# Patient Record
Sex: MALE | HISPANIC OR LATINO | Employment: FULL TIME | URBAN - METROPOLITAN AREA
[De-identification: names, ages, dates, MRNs, and addresses within clinical notes are randomized per-mention and may not be internally consistent; named-entity substitution may affect disease eponyms.]

---

## 2017-08-06 ENCOUNTER — OFFICE VISIT (OUTPATIENT)
Dept: URGENT CARE | Facility: PHYSICIAN GROUP | Age: 42
End: 2017-08-06
Payer: COMMERCIAL

## 2017-08-06 VITALS
BODY MASS INDEX: 33.74 KG/M2 | WEIGHT: 215 LBS | SYSTOLIC BLOOD PRESSURE: 140 MMHG | HEIGHT: 67 IN | DIASTOLIC BLOOD PRESSURE: 90 MMHG | OXYGEN SATURATION: 95 % | HEART RATE: 67 BPM | TEMPERATURE: 98.8 F

## 2017-08-06 DIAGNOSIS — S86.111A GASTROCNEMIUS MUSCLE TEAR, RIGHT, INITIAL ENCOUNTER: ICD-10-CM

## 2017-08-06 PROCEDURE — 99213 OFFICE O/P EST LOW 20 MIN: CPT | Performed by: PHYSICIAN ASSISTANT

## 2017-08-06 ASSESSMENT — ENCOUNTER SYMPTOMS
FALLS: 0
JOINT SWELLING: 0
TREMORS: 0
MYALGIAS: 1
WEIGHT LOSS: 0
TINGLING: 0
ARTHRALGIAS: 0
SENSORY CHANGE: 0
WEAKNESS: 0
CHILLS: 0
BACK PAIN: 0
LEG PAIN: 1
FEVER: 0
NUMBNESS: 0

## 2017-08-06 ASSESSMENT — PATIENT HEALTH QUESTIONNAIRE - PHQ9: CLINICAL INTERPRETATION OF PHQ2 SCORE: 0

## 2017-08-06 NOTE — PROGRESS NOTES
Subjective:      Jose Juan Hatch is a 41 y.o. male who presents with Leg Pain            HPI Comments: Patient was playing soccer when he made a sudden change in motion and felt a pop in the back of the calf and it felt as though he had been kicked in the back of the leg.  He has swelling and decreased movement of the right lower leg.  He denies pain when at rest but pain with walking, weight bearing and walking.  No previous h/o injury to the leg.  Denies numbness/tingling.  He has been icing, using Ibuprofen and wrapping the leg for discomfort with relief.      Leg Pain  This is a new problem. The current episode started yesterday. The problem occurs constantly. The problem has been unchanged. Associated symptoms include myalgias. Pertinent negatives include no arthralgias, chills, fever, joint swelling, numbness or weakness. The symptoms are aggravated by walking. He has tried NSAIDs, ice and immobilization for the symptoms. The treatment provided moderate relief.   PMH:  has a past medical history of Acid reflux and Kidney stone.  MEDS:   Current outpatient prescriptions:   •  OMEPRAZOLE PO, Take  by mouth., Disp: , Rfl:   ALLERGIES: No Known Allergies  SURGHX: History reviewed. No pertinent past surgical history.  SOCHX:  reports that he has never smoked. He does not have any smokeless tobacco history on file. He reports that he drinks about 4.2 oz of alcohol per week. He reports that he does not use illicit drugs.  FH: Family history was reviewed, no pertinent findings to report     Review of Systems   Constitutional: Negative for fever, chills, weight loss and malaise/fatigue.   Musculoskeletal: Positive for myalgias. Negative for back pain, joint pain, falls, joint swelling and arthralgias.   Neurological: Negative for tingling, tremors, sensory change, weakness and numbness.   All other systems reviewed and are negative.         Objective:     /90 mmHg  Pulse 67  Temp(Src) 37.1 °C (98.8 °F)  Ht 1.702  "m (5' 7.01\")  Wt 97.523 kg (215 lb)  BMI 33.67 kg/m2  SpO2 95%     Physical Exam   Constitutional: He is oriented to person, place, and time. He appears well-developed and well-nourished.   Cardiovascular: Intact distal pulses.    Musculoskeletal:   Right LE decreased extension of the right foot, full flexion;  TTP over the right calf with mild swelling;  Ankle and knee joints are without tenderness or swelling;  Gallegos test is negative, limited evaluation due to discomfort but patient does have plantarflexion   Neurological: He is alert and oriented to person, place, and time.   Skin: Skin is warm and dry. No rash noted. No erythema. No pallor.   Mild swelling of the right posterior lower leg   Psychiatric: He has a normal mood and affect. His behavior is normal. Judgment and thought content normal.   Nursing note and vitals reviewed.              Assessment/Plan:     1. Gastrocnemius muscle tear, right, initial encounter  Patient referred to Sports Medicine for ongoing management of suspected tear.  Will treat with RICE and non-weight bearing.  Patient declined need for pain medication.  Follow-up sooner if symptoms change, get worse or new symptoms develop.    - CRUTCHES  - BANDAGE ACE 4\"  - REFERRAL TO SPORTS MEDICINE        "

## 2017-08-06 NOTE — MR AVS SNAPSHOT
"        Jose Juan Steeleala   2017 1:45 PM   Office Visit   MRN: [id number]    Department:  [facility]   Dept Phone:  [phone]    Description:  Male : 1975   Provider:  Meche Myrick PA-C           Reason for Visit     Leg Pain Right calf pain pt states he was playing soccer and he was trying to kick the ball and felt a pop Sx pain when walking x yesterday      Allergies as of 2017     No Known Allergies      You were diagnosed with     Gastrocnemius muscle tear, right, initial encounter   [[id number]]         Vital Signs     Blood Pressure Pulse Temperature Height Weight Body Mass Index    140/90 mmHg 67 37.1 °C (98.8 °F) 1.702 m (5' 7.01\") 97.523 kg (215 lb) 33.67 kg/m2    Oxygen Saturation Smoking Status                95% Never Smoker           Basic Information     Date Of Birth Sex Race Ethnicity Preferred Language    1975 Male  or   Origin (Syriac,Tuvaluan,St Lucian,Guyanese, etc) English      Health Maintenance        Date Due Completion Dates    IMM DTaP/Tdap/Td Vaccine (1 - Tdap) 1993 ---    IMM INFLUENZA (1) 2017 ---            Current Immunizations     No immunizations on file.      Below and/or attached are the medications your provider expects you to take. Review all of your home medications and newly ordered medications with your provider and/or pharmacist. Follow medication instructions as directed by your provider and/or pharmacist. Please keep your medication list with you and share with your provider. Update the information when medications are discontinued, doses are changed, or new medications (including over-the-counter products) are added; and carry medication information at all times in the event of emergency situations     Allergies:  No Known Allergies          Medications  Valid as of: 2017 -  3:07 PM    Generic Name Brand Name Tablet Size Instructions for use    Omeprazole   Take  by mouth.        .                 "   Medicines prescribed today were sent to:     [id number] - [place], [place] - [address] [place] [address]    [address] [place] [address]    Phone: [phone] Fax: [phone]    Open 24 Hours?: No      Medication refill instructions:       If your prescription bottle indicates you have medication refills left, it is not necessary to call your provider’s office. Please contact your pharmacy and they will refill your medication.    If your prescription bottle indicates you do not have any refills left, you may request refills at any time through one of the following ways: The online HengZhi system (except Urgent Care), by calling your provider’s office, or by asking your pharmacy to contact your provider’s office with a refill request. Medication refills are processed only during regular business hours and may not be available until the next business day. Your provider may request additional information or to have a follow-up visit with you prior to refilling your medication.   *Please Note: Medication refills are assigned a new Rx number when refilled electronically. Your pharmacy may indicate that no refills were authorized even though a new prescription for the same medication is available at the pharmacy. Please request the medicine by name with the pharmacy before contacting your provider for a refill.        Referral     A referral request has been sent to our patient care coordination department. Please allow 3-5 business days for us to process this request and contact you either by phone or mail. If you do not hear from us by the 5th business day, please call us at [phone].           HengZhi Access Code: [email or web address]  Expires: 9/5/2017  1:33 PM    HengZhi  A secure, online tool to manage your health information     [employer]’s HengZhi® is a secure, online tool that connects you to your personalized health information from the privacy of your home -- day or night - making it very easy for you  to manage your healthcare. Once the activation process is completed, you can even access your medical information using the M360LOHAS outdoors marcelo, which is available for free in the Apple Marcelo store or Google Play store.     M360LOHAS outdoors provides the following levels of access (as shown below):   My Chart Features   [facility] Doctor [facility]  Specialists [facility]  Urgent  Care Non-[facility]  Primary Care  Doctor   Email your healthcare team securely and privately 24/7 X X X    Manage appointments: schedule your next appointment; view details of past/upcoming appointments X      Request prescription refills. X      View recent personal medical records, including lab and immunizations X X X X   View health record, including health history, allergies, medications X X X X   Read reports about your outpatient visits, procedures, consult and ER notes X X X X   See your discharge summary, which is a recap of your hospital and/or ER visit that includes your diagnosis, lab results, and care plan. X X       How to register for M360LOHAS outdoors:  1. Go to  [email or web address].  2. Click on the Sign Up Now box, which takes you to the New Member Sign Up page. You will need to provide the following information:  a. Enter your M360LOHAS outdoors Access Code exactly as it appears at the top of this page. (You will not need to use this code after you’ve completed the sign-up process. If you do not sign up before the expiration date, you must request a new code.)   b. Enter your date of birth.   c. Enter your home email address.   d. Click Submit, and follow the next screen’s instructions.  3. Create a M360LOHAS outdoors ID. This will be your M360LOHAS outdoors login ID and cannot be changed, so think of one that is secure and easy to remember.  4. Create a M360LOHAS outdoors password. You can change your password at any time.  5. Enter your Password Reset Question and Answer. This can be used at a later time if you forget your password.   6. Enter your e-mail address. This allows you to  receive e-mail notifications when new information is available in [employer].  7. Click Sign Up. You can now view your health information.    For assistance activating your [employer] account, call [phone]

## 2017-08-07 ENCOUNTER — TELEPHONE (OUTPATIENT)
Dept: URGENT CARE | Facility: PHYSICIAN GROUP | Age: 42
End: 2017-08-07

## 2017-08-07 NOTE — TELEPHONE ENCOUNTER
PtJose Juan, phoned stating he was returning our call.  He was transferred to the [facility] site to set up an appointment for Sports Medicine.

## 2017-08-10 ENCOUNTER — OFFICE VISIT (OUTPATIENT)
Dept: MEDICAL GROUP | Facility: CLINIC | Age: 42
End: 2017-08-10
Payer: COMMERCIAL

## 2017-08-10 VITALS
RESPIRATION RATE: 16 BRPM | DIASTOLIC BLOOD PRESSURE: 86 MMHG | SYSTOLIC BLOOD PRESSURE: 126 MMHG | OXYGEN SATURATION: 96 % | WEIGHT: 215 LBS | BODY MASS INDEX: 33.74 KG/M2 | TEMPERATURE: 98 F | HEART RATE: 79 BPM | HEIGHT: 67 IN

## 2017-08-10 DIAGNOSIS — S86.111A: ICD-10-CM

## 2017-08-10 PROCEDURE — 99203 OFFICE O/P NEW LOW 30 MIN: CPT | Performed by: FAMILY MEDICINE

## 2017-08-10 ASSESSMENT — ENCOUNTER SYMPTOMS
NAUSEA: 0
CHILLS: 0
SHORTNESS OF BREATH: 0
DIZZINESS: 0
VOMITING: 0
FEVER: 0

## 2017-08-10 NOTE — MR AVS SNAPSHOT
"        Jose Juan Steeleala   8/10/2017 4:30 PM   Office Visit   MRN: [id number]    Department:  [facility]   Dept Phone:  [phone]    Description:  Male : 1975   Provider:  Wang Guerrier M.D.           Reason for Visit     Leg Injury Referral from UC/ R leg muscle tear       Allergies as of 8/10/2017     No Known Allergies      You were diagnosed with     Strain of gastrocnemius tendon of right lower extremity, initial encounter   [[id number]]         Vital Signs     Blood Pressure Pulse Temperature Respirations Height Weight    126/86 mmHg 79 36.7 °C (98 °F) 16 1.702 m (5' 7.01\") 97.523 kg (215 lb)    Body Mass Index Oxygen Saturation Smoking Status             33.67 kg/m2 96% Never Smoker          Basic Information     Date Of Birth Sex Race Ethnicity Preferred Language    1975 Male  or   Origin (Japanese,Cape Verdean,Macedonian,New Zealander, etc) English      Your appointments     Aug 31, 2017  4:00 PM   Established Patient with Wang Guerrier M.D.   [facility]    [address] [address]   [phone]           You will be receiving a confirmation call a few days before your appointment from our automated call confirmation system.              Health Maintenance        Date Due Completion Dates    IMM DTaP/Tdap/Td Vaccine (1 - Tdap) 1993 ---    IMM INFLUENZA (1) 2017 ---            Current Immunizations     No immunizations on file.      Below and/or attached are the medications your provider expects you to take. Review all of your home medications and newly ordered medications with your provider and/or pharmacist. Follow medication instructions as directed by your provider and/or pharmacist. Please keep your medication list with you and share with your provider. Update the information when medications are discontinued, doses are changed, or new medications (including over-the-counter products) are added; and carry " medication information at all times in the event of emergency situations     Allergies:  No Known Allergies          Medications  Valid as of: August 11, 2017 -  8:24 AM    Generic Name Brand Name Tablet Size Instructions for use    Omeprazole   Take  by mouth.        .                 Medicines prescribed today were sent to:     [id number] - [place], [place] - [address]    [address] [place] [address]    Phone: [phone] Fax: [phone]    Open 24 Hours?: No      Medication refill instructions:       If your prescription bottle indicates you have medication refills left, it is not necessary to call your provider’s office. Please contact your pharmacy and they will refill your medication.    If your prescription bottle indicates you do not have any refills left, you may request refills at any time through one of the following ways: The online Eltechs system (except Urgent Care), by calling your provider’s office, or by asking your pharmacy to contact your provider’s office with a refill request. Medication refills are processed only during regular business hours and may not be available until the next business day. Your provider may request additional information or to have a follow-up visit with you prior to refilling your medication.   *Please Note: Medication refills are assigned a new Rx number when refilled electronically. Your pharmacy may indicate that no refills were authorized even though a new prescription for the same medication is available at the pharmacy. Please request the medicine by name with the pharmacy before contacting your provider for a refill.           Eltechs Access Code: [email or web address]  Expires: 9/5/2017  1:33 PM    Eltechs  A secure, online tool to manage your health information     [employer]’s Eltechs® is a secure, online tool that connects you to your personalized health information from the privacy of your home -- day or night - making it very easy for you to  manage your healthcare. Once the activation process is completed, you can even access your medical information using the Heroku marcelo, which is available for free in the Apple Marcelo store or Google Play store.     Heroku provides the following levels of access (as shown below):   My Chart Features   [facility] Doctor [facility]  Specialists [facility]  Urgent  Care Non-[facility]  Primary Care  Doctor   Email your healthcare team securely and privately 24/7 X X X    Manage appointments: schedule your next appointment; view details of past/upcoming appointments X      Request prescription refills. X      View recent personal medical records, including lab and immunizations X X X X   View health record, including health history, allergies, medications X X X X   Read reports about your outpatient visits, procedures, consult and ER notes X X X X   See your discharge summary, which is a recap of your hospital and/or ER visit that includes your diagnosis, lab results, and care plan. X X       How to register for Heroku:  1. Go to  [email or web address].  2. Click on the Sign Up Now box, which takes you to the New Member Sign Up page. You will need to provide the following information:  a. Enter your Heroku Access Code exactly as it appears at the top of this page. (You will not need to use this code after you’ve completed the sign-up process. If you do not sign up before the expiration date, you must request a new code.)   b. Enter your date of birth.   c. Enter your home email address.   d. Click Submit, and follow the next screen’s instructions.  3. Create a Heroku ID. This will be your Heroku login ID and cannot be changed, so think of one that is secure and easy to remember.  4. Create a Heroku password. You can change your password at any time.  5. Enter your Password Reset Question and Answer. This can be used at a later time if you forget your password.   6. Enter your e-mail address. This allows you to  receive e-mail notifications when new information is available in [employer].  7. Click Sign Up. You can now view your health information.    For assistance activating your [employer] account, call [phone]

## 2017-08-10 NOTE — PROGRESS NOTES
"Subjective:      Jose Juan Hatch is a 41 y.o. male who presents with Leg Injury      Referred by Meche Myrick PA-C for evaluation of RIGHT leg pain    Leg Injury     DOI: Saturday, 8/5/17    Playing soccer he was cutting and felt a pop in the back of the RIGHT medial calf and it felt as though he had been kicked in the back of the leg.  Ache  No radiation    He has POSITIVE swelling and decreased movement of the right lower leg.    No pain at rest but pain with walking, weight bearing and walking.    No previous h/o injury to the leg.  Denies numbness/tingling.    He has been icing, using Ibuprofen and wrapping the leg for discomfort with relief.    Review of Systems   Constitutional: Negative for fever and chills.   Respiratory: Negative for shortness of breath.    Cardiovascular: Negative for chest pain.   Gastrointestinal: Negative for nausea and vomiting.   Neurological: Negative for dizziness.     PMH:  has a past medical history of Acid reflux and Kidney stone.  MEDS:   Current outpatient prescriptions:   •  OMEPRAZOLE PO, Take  by mouth., Disp: , Rfl:   ALLERGIES: No Known Allergies  SURGHX: History reviewed. No pertinent past surgical history.  SOCHX:  reports that he has never smoked. He has never used smokeless tobacco. He reports that he drinks about 4.2 oz of alcohol per week. He reports that he does not use illicit drugs.  FH: Family history was reviewed, no pertinent findings to report       Objective:     /86 mmHg  Pulse 79  Temp(Src) 36.7 °C (98 °F)  Resp 16  Ht 1.702 m (5' 7.01\")  Wt 97.523 kg (215 lb)  BMI 33.67 kg/m2  SpO2 96%     Physical Exam   Constitutional: He appears well-developed.   Pulmonary/Chest: Effort normal.   Neurological: He is alert.   Skin: Skin is warm and dry.   Psychiatric: He has a normal mood and affect.         ANTALGIC gait  15 cm distal to inf patella  RIGHT calf measures 43 cm  LEFT measures 42 cm  POSITIVE tenderness at the distal aspect of the medial " gastrocnemius on the RIGHT just above the musculotendinous junction compared to the left  PAIN is worse with passive dorsiflexion and active plantar flexion on the RIGHT compared to left with the knee in extension  There is NO pain with passive dorsiflexion or active plantar flexion with the knee flexed     Assessment/Plan:     1. Strain of gastrocnemius tendon of right lower extremity, initial encounter         Acute medial gastrocnemius injury while playing soccer/cutting   Patient was placed in a cam walker boot  Advised to wear a boot for at least 3-4 weeks (removal on or after August 31, 2017)    Return in about 3 weeks (around 8/31/2017).    Thank you Meche Myrick PA-C for allowing me to participate in caring for your patient.

## 2018-12-10 ENCOUNTER — OFFICE VISIT (OUTPATIENT)
Dept: URGENT CARE | Facility: PHYSICIAN GROUP | Age: 43
End: 2018-12-10
Payer: COMMERCIAL

## 2018-12-10 VITALS
SYSTOLIC BLOOD PRESSURE: 160 MMHG | HEART RATE: 95 BPM | TEMPERATURE: 97.3 F | BODY MASS INDEX: 34.84 KG/M2 | WEIGHT: 222 LBS | RESPIRATION RATE: 18 BRPM | OXYGEN SATURATION: 98 % | DIASTOLIC BLOOD PRESSURE: 104 MMHG | HEIGHT: 67 IN

## 2018-12-10 DIAGNOSIS — I10 ESSENTIAL HYPERTENSION: ICD-10-CM

## 2018-12-10 DIAGNOSIS — K21.9 GASTROESOPHAGEAL REFLUX DISEASE, ESOPHAGITIS PRESENCE NOT SPECIFIED: ICD-10-CM

## 2018-12-10 PROCEDURE — 99214 OFFICE O/P EST MOD 30 MIN: CPT | Performed by: FAMILY MEDICINE

## 2018-12-10 RX ORDER — LISINOPRIL 10 MG/1
10 TABLET ORAL DAILY
Qty: 30 TAB | Refills: 0 | Status: SHIPPED | OUTPATIENT
Start: 2018-12-10 | End: 2018-12-11

## 2018-12-10 RX ORDER — SUCRALFATE 1 G/1
1 TABLET ORAL
Qty: 120 TAB | Refills: 0 | Status: SHIPPED | OUTPATIENT
Start: 2018-12-10 | End: 2018-12-11

## 2018-12-10 RX ORDER — IBUPROFEN 200 MG
400 TABLET ORAL EVERY 6 HOURS PRN
COMMUNITY
End: 2019-02-01

## 2018-12-10 ASSESSMENT — ENCOUNTER SYMPTOMS
LOWER EXTREMITY EDEMA: 0
PALPITATIONS: 0
COUGH: 0
IRREGULAR HEARTBEAT: 0
FEVER: 0
ABDOMINAL PAIN: 0
PND: 0
LEG PAIN: 0
NAUSEA: 0

## 2018-12-10 NOTE — PROGRESS NOTES
"Subjective:   Jose Juan Hatch is a 43 y.o. male who presents for Chest Pain (discomfort onset last night)        Chest Pain    This is a new problem. The current episode started yesterday. The onset quality is sudden. The problem occurs constantly. The problem has been waxing and waning. The pain is present in the substernal region. The pain is moderate. The quality of the pain is described as sharp. The pain does not radiate. Pertinent negatives include no abdominal pain, cough, fever, irregular heartbeat, leg pain, lower extremity edema, nausea, palpitations or PND.     Review of Systems   Constitutional: Negative for fever.   Respiratory: Negative for cough.    Cardiovascular: Positive for chest pain. Negative for palpitations and PND.   Gastrointestinal: Negative for abdominal pain and nausea.     No Known Allergies   Objective:   /104   Pulse 95   Temp 36.3 °C (97.3 °F) (Temporal)   Resp 18   Ht 1.702 m (5' 7\")   Wt 100.7 kg (222 lb)   SpO2 98%   BMI 34.77 kg/m²   Physical Exam   Constitutional: He is oriented to person, place, and time. He appears well-developed and well-nourished. No distress.   HENT:   Head: Normocephalic and atraumatic.   Eyes: Pupils are equal, round, and reactive to light. Conjunctivae and EOM are normal.   Cardiovascular: Normal rate and regular rhythm.    No murmur heard.  Pulmonary/Chest: Effort normal and breath sounds normal. No respiratory distress.   Abdominal: Soft. He exhibits no distension. There is no tenderness.   Neurological: He is alert and oriented to person, place, and time. He has normal reflexes. No sensory deficit.   Skin: Skin is warm and dry.   Psychiatric: He has a normal mood and affect.         Assessment/Plan:   1. Gastroesophageal reflux disease, esophagitis presence not specified  - EKG - Clinic Performed  - hyoscyamine-maalox plus-lidocaine viscous (GI COCKTAIL); Take 30 mL by mouth Once for 1 dose.  Dispense: 30 mL; Refill: 0  - sucralfate " (CARAFATE) 1 GM Tab; Take 1 Tab by mouth 4 Times a Day,Before Meals and at Bedtime.  Dispense: 120 Tab; Refill: 0    2. Essential hypertension  - lisinopril (PRINIVIL) 10 MG Tab; Take 1 Tab by mouth every day.  Dispense: 30 Tab; Refill: 0    Other orders  - ibuprofen (MOTRIN) 200 MG Tab; Take 200 mg by mouth every 6 hours as needed.  Significant improvement with in office GI cocktail.  Differential diagnosis, natural history, supportive care, and indications for immediate follow-up discussed.

## 2018-12-11 ENCOUNTER — APPOINTMENT (OUTPATIENT)
Dept: RADIOLOGY | Facility: MEDICAL CENTER | Age: 43
End: 2018-12-11
Attending: EMERGENCY MEDICINE
Payer: COMMERCIAL

## 2018-12-11 ENCOUNTER — HOSPITAL ENCOUNTER (EMERGENCY)
Facility: MEDICAL CENTER | Age: 43
End: 2018-12-11
Attending: EMERGENCY MEDICINE
Payer: COMMERCIAL

## 2018-12-11 ENCOUNTER — HOSPITAL ENCOUNTER (INPATIENT)
Facility: MEDICAL CENTER | Age: 43
LOS: 2 days | DRG: 247 | End: 2018-12-13
Attending: EMERGENCY MEDICINE | Admitting: INTERNAL MEDICINE
Payer: COMMERCIAL

## 2018-12-11 ENCOUNTER — APPOINTMENT (OUTPATIENT)
Dept: URGENT CARE | Facility: PHYSICIAN GROUP | Age: 43
End: 2018-12-11
Payer: COMMERCIAL

## 2018-12-11 VITALS
OXYGEN SATURATION: 98 % | SYSTOLIC BLOOD PRESSURE: 169 MMHG | WEIGHT: 224.65 LBS | TEMPERATURE: 98.1 F | BODY MASS INDEX: 35.26 KG/M2 | HEIGHT: 67 IN | DIASTOLIC BLOOD PRESSURE: 103 MMHG | RESPIRATION RATE: 11 BRPM | HEART RATE: 77 BPM

## 2018-12-11 DIAGNOSIS — I24.9 ACUTE CORONARY SYNDROME (HCC): ICD-10-CM

## 2018-12-11 DIAGNOSIS — I21.4 NSTEMI (NON-ST ELEVATED MYOCARDIAL INFARCTION) (HCC): ICD-10-CM

## 2018-12-11 PROBLEM — E66.9 OBESITY: Status: ACTIVE | Noted: 2018-12-11

## 2018-12-11 PROBLEM — I10 HYPERTENSION: Status: ACTIVE | Noted: 2018-12-11

## 2018-12-11 PROBLEM — E78.5 HLD (HYPERLIPIDEMIA): Status: ACTIVE | Noted: 2018-12-11

## 2018-12-11 LAB
ALBUMIN SERPL BCP-MCNC: 4.5 G/DL (ref 3.2–4.9)
ALBUMIN SERPL BCP-MCNC: 4.9 G/DL (ref 3.2–4.9)
ALBUMIN/GLOB SERPL: 1.2 G/DL
ALBUMIN/GLOB SERPL: 1.2 G/DL
ALP SERPL-CCNC: 87 U/L (ref 30–99)
ALP SERPL-CCNC: 93 U/L (ref 30–99)
ALT SERPL-CCNC: 44 U/L (ref 2–50)
ALT SERPL-CCNC: 53 U/L (ref 2–50)
ANION GAP SERPL CALC-SCNC: 8 MMOL/L (ref 0–11.9)
ANION GAP SERPL CALC-SCNC: 9 MMOL/L (ref 0–11.9)
APTT PPP: 30.7 SEC (ref 24.7–36)
AST SERPL-CCNC: 41 U/L (ref 12–45)
AST SERPL-CCNC: 50 U/L (ref 12–45)
BASOPHILS # BLD AUTO: 0.3 % (ref 0–1.8)
BASOPHILS # BLD AUTO: 0.4 % (ref 0–1.8)
BASOPHILS # BLD: 0.03 K/UL (ref 0–0.12)
BASOPHILS # BLD: 0.03 K/UL (ref 0–0.12)
BILIRUB SERPL-MCNC: 0.8 MG/DL (ref 0.1–1.5)
BILIRUB SERPL-MCNC: 0.8 MG/DL (ref 0.1–1.5)
BNP SERPL-MCNC: 48 PG/ML (ref 0–100)
BNP SERPL-MCNC: 54 PG/ML (ref 0–100)
BUN SERPL-MCNC: 15 MG/DL (ref 8–22)
BUN SERPL-MCNC: 16 MG/DL (ref 8–22)
CALCIUM SERPL-MCNC: 9.2 MG/DL (ref 8.4–10.2)
CALCIUM SERPL-MCNC: 9.7 MG/DL (ref 8.5–10.5)
CHLORIDE SERPL-SCNC: 101 MMOL/L (ref 96–112)
CHLORIDE SERPL-SCNC: 104 MMOL/L (ref 96–112)
CO2 SERPL-SCNC: 24 MMOL/L (ref 20–33)
CO2 SERPL-SCNC: 25 MMOL/L (ref 20–33)
CREAT SERPL-MCNC: 1.08 MG/DL (ref 0.5–1.4)
CREAT SERPL-MCNC: 1.14 MG/DL (ref 0.5–1.4)
EKG IMPRESSION: NORMAL
EKG IMPRESSION: NORMAL
EOSINOPHIL # BLD AUTO: 0.02 K/UL (ref 0–0.51)
EOSINOPHIL # BLD AUTO: 0.02 K/UL (ref 0–0.51)
EOSINOPHIL NFR BLD: 0.2 % (ref 0–6.9)
EOSINOPHIL NFR BLD: 0.2 % (ref 0–6.9)
ERYTHROCYTE [DISTWIDTH] IN BLOOD BY AUTOMATED COUNT: 39.9 FL (ref 35.9–50)
ERYTHROCYTE [DISTWIDTH] IN BLOOD BY AUTOMATED COUNT: 41.5 FL (ref 35.9–50)
GLOBULIN SER CALC-MCNC: 3.7 G/DL (ref 1.9–3.5)
GLOBULIN SER CALC-MCNC: 4.2 G/DL (ref 1.9–3.5)
GLUCOSE SERPL-MCNC: 109 MG/DL (ref 65–99)
GLUCOSE SERPL-MCNC: 116 MG/DL (ref 65–99)
HCT VFR BLD AUTO: 46.1 % (ref 42–52)
HCT VFR BLD AUTO: 48.4 % (ref 42–52)
HGB BLD-MCNC: 15.9 G/DL (ref 14–18)
HGB BLD-MCNC: 17 G/DL (ref 14–18)
IMM GRANULOCYTES # BLD AUTO: 0.02 K/UL (ref 0–0.11)
IMM GRANULOCYTES # BLD AUTO: 0.02 K/UL (ref 0–0.11)
IMM GRANULOCYTES NFR BLD AUTO: 0.2 % (ref 0–0.9)
IMM GRANULOCYTES NFR BLD AUTO: 0.2 % (ref 0–0.9)
INR PPP: 1.12 (ref 0.87–1.13)
LIPASE SERPL-CCNC: 24 U/L (ref 7–58)
LYMPHOCYTES # BLD AUTO: 2.72 K/UL (ref 1–4.8)
LYMPHOCYTES # BLD AUTO: 2.99 K/UL (ref 1–4.8)
LYMPHOCYTES NFR BLD: 28.7 % (ref 22–41)
LYMPHOCYTES NFR BLD: 35.5 % (ref 22–41)
MCH RBC QN AUTO: 32.2 PG (ref 27–33)
MCH RBC QN AUTO: 32.6 PG (ref 27–33)
MCHC RBC AUTO-ENTMCNC: 34.5 G/DL (ref 33.7–35.3)
MCHC RBC AUTO-ENTMCNC: 35.1 G/DL (ref 33.7–35.3)
MCV RBC AUTO: 91.7 FL (ref 81.4–97.8)
MCV RBC AUTO: 94.7 FL (ref 81.4–97.8)
MONOCYTES # BLD AUTO: 0.6 K/UL (ref 0–0.85)
MONOCYTES # BLD AUTO: 0.61 K/UL (ref 0–0.85)
MONOCYTES NFR BLD AUTO: 6.3 % (ref 0–13.4)
MONOCYTES NFR BLD AUTO: 7.2 % (ref 0–13.4)
NEUTROPHILS # BLD AUTO: 4.75 K/UL (ref 1.82–7.42)
NEUTROPHILS # BLD AUTO: 6.09 K/UL (ref 1.82–7.42)
NEUTROPHILS NFR BLD: 56.5 % (ref 44–72)
NEUTROPHILS NFR BLD: 64.3 % (ref 44–72)
NRBC # BLD AUTO: 0 K/UL
NRBC # BLD AUTO: 0 K/UL
NRBC BLD-RTO: 0 /100 WBC
NRBC BLD-RTO: 0 /100 WBC
PLATELET # BLD AUTO: 210 K/UL (ref 164–446)
PLATELET # BLD AUTO: 210 K/UL (ref 164–446)
PMV BLD AUTO: 9.3 FL (ref 9–12.9)
PMV BLD AUTO: 9.6 FL (ref 9–12.9)
POTASSIUM SERPL-SCNC: 3.9 MMOL/L (ref 3.6–5.5)
POTASSIUM SERPL-SCNC: 3.9 MMOL/L (ref 3.6–5.5)
PROT SERPL-MCNC: 8.2 G/DL (ref 6–8.2)
PROT SERPL-MCNC: 9.1 G/DL (ref 6–8.2)
PROTHROMBIN TIME: 14.3 SEC (ref 12–14.6)
RBC # BLD AUTO: 4.87 M/UL (ref 4.7–6.1)
RBC # BLD AUTO: 5.28 M/UL (ref 4.7–6.1)
SODIUM SERPL-SCNC: 134 MMOL/L (ref 135–145)
SODIUM SERPL-SCNC: 137 MMOL/L (ref 135–145)
TROPONIN I SERPL-MCNC: 1.1 NG/ML (ref 0–0.04)
TROPONIN I SERPL-MCNC: 2.88 NG/ML (ref 0–0.04)
TSH SERPL DL<=0.005 MIU/L-ACNC: 4.35 UIU/ML (ref 0.38–5.33)
WBC # BLD AUTO: 8.4 K/UL (ref 4.8–10.8)
WBC # BLD AUTO: 9.5 K/UL (ref 4.8–10.8)

## 2018-12-11 PROCEDURE — 700111 HCHG RX REV CODE 636 W/ 250 OVERRIDE (IP): Performed by: INTERNAL MEDICINE

## 2018-12-11 PROCEDURE — 96366 THER/PROPH/DIAG IV INF ADDON: CPT

## 2018-12-11 PROCEDURE — 96375 TX/PRO/DX INJ NEW DRUG ADDON: CPT

## 2018-12-11 PROCEDURE — 99285 EMERGENCY DEPT VISIT HI MDM: CPT

## 2018-12-11 PROCEDURE — 84443 ASSAY THYROID STIM HORMONE: CPT

## 2018-12-11 PROCEDURE — 84484 ASSAY OF TROPONIN QUANT: CPT | Mod: 91

## 2018-12-11 PROCEDURE — 83880 ASSAY OF NATRIURETIC PEPTIDE: CPT

## 2018-12-11 PROCEDURE — A9270 NON-COVERED ITEM OR SERVICE: HCPCS | Performed by: EMERGENCY MEDICINE

## 2018-12-11 PROCEDURE — 80053 COMPREHEN METABOLIC PANEL: CPT

## 2018-12-11 PROCEDURE — 36415 COLL VENOUS BLD VENIPUNCTURE: CPT

## 2018-12-11 PROCEDURE — 85610 PROTHROMBIN TIME: CPT

## 2018-12-11 PROCEDURE — 85025 COMPLETE CBC W/AUTO DIFF WBC: CPT | Mod: 91

## 2018-12-11 PROCEDURE — 85025 COMPLETE CBC W/AUTO DIFF WBC: CPT

## 2018-12-11 PROCEDURE — 83036 HEMOGLOBIN GLYCOSYLATED A1C: CPT

## 2018-12-11 PROCEDURE — 93005 ELECTROCARDIOGRAM TRACING: CPT

## 2018-12-11 PROCEDURE — 96374 THER/PROPH/DIAG INJ IV PUSH: CPT

## 2018-12-11 PROCEDURE — 83880 ASSAY OF NATRIURETIC PEPTIDE: CPT | Mod: 91

## 2018-12-11 PROCEDURE — 99284 EMERGENCY DEPT VISIT MOD MDM: CPT

## 2018-12-11 PROCEDURE — 71045 X-RAY EXAM CHEST 1 VIEW: CPT

## 2018-12-11 PROCEDURE — 93005 ELECTROCARDIOGRAM TRACING: CPT | Performed by: EMERGENCY MEDICINE

## 2018-12-11 PROCEDURE — 700102 HCHG RX REV CODE 250 W/ 637 OVERRIDE(OP): Performed by: EMERGENCY MEDICINE

## 2018-12-11 PROCEDURE — 80053 COMPREHEN METABOLIC PANEL: CPT | Mod: 91

## 2018-12-11 PROCEDURE — 700101 HCHG RX REV CODE 250: Performed by: EMERGENCY MEDICINE

## 2018-12-11 PROCEDURE — 83690 ASSAY OF LIPASE: CPT

## 2018-12-11 PROCEDURE — 770020 HCHG ROOM/CARE - TELE (206)

## 2018-12-11 PROCEDURE — 85730 THROMBOPLASTIN TIME PARTIAL: CPT

## 2018-12-11 PROCEDURE — 99285 EMERGENCY DEPT VISIT HI MDM: CPT | Performed by: INTERNAL MEDICINE

## 2018-12-11 PROCEDURE — 84484 ASSAY OF TROPONIN QUANT: CPT

## 2018-12-11 PROCEDURE — 700111 HCHG RX REV CODE 636 W/ 250 OVERRIDE (IP): Performed by: EMERGENCY MEDICINE

## 2018-12-11 PROCEDURE — 96365 THER/PROPH/DIAG IV INF INIT: CPT

## 2018-12-11 PROCEDURE — 304561 HCHG STAT O2

## 2018-12-11 PROCEDURE — 99223 1ST HOSP IP/OBS HIGH 75: CPT | Performed by: INTERNAL MEDICINE

## 2018-12-11 RX ORDER — ATORVASTATIN CALCIUM 80 MG/1
80 TABLET, FILM COATED ORAL EVERY EVENING
Status: DISCONTINUED | OUTPATIENT
Start: 2018-12-11 | End: 2018-12-12

## 2018-12-11 RX ORDER — METOPROLOL TARTRATE 1 MG/ML
5 INJECTION, SOLUTION INTRAVENOUS ONCE
Status: COMPLETED | OUTPATIENT
Start: 2018-12-11 | End: 2018-12-11

## 2018-12-11 RX ORDER — ONDANSETRON 2 MG/ML
4 INJECTION INTRAMUSCULAR; INTRAVENOUS EVERY 4 HOURS PRN
Status: DISCONTINUED | OUTPATIENT
Start: 2018-12-11 | End: 2018-12-13 | Stop reason: HOSPADM

## 2018-12-11 RX ORDER — OMEPRAZOLE 20 MG/1
20 CAPSULE, DELAYED RELEASE ORAL DAILY
Status: DISCONTINUED | OUTPATIENT
Start: 2018-12-12 | End: 2018-12-13 | Stop reason: HOSPADM

## 2018-12-11 RX ORDER — MORPHINE SULFATE 10 MG/ML
4 INJECTION, SOLUTION INTRAMUSCULAR; INTRAVENOUS ONCE
Status: COMPLETED | OUTPATIENT
Start: 2018-12-11 | End: 2018-12-11

## 2018-12-11 RX ORDER — MORPHINE SULFATE 4 MG/ML
4 INJECTION, SOLUTION INTRAMUSCULAR; INTRAVENOUS ONCE
Status: COMPLETED | OUTPATIENT
Start: 2018-12-11 | End: 2018-12-11

## 2018-12-11 RX ORDER — SODIUM CHLORIDE 9 MG/ML
INJECTION, SOLUTION INTRAVENOUS CONTINUOUS
Status: DISCONTINUED | OUTPATIENT
Start: 2018-12-12 | End: 2018-12-12

## 2018-12-11 RX ORDER — MORPHINE SULFATE 10 MG/ML
2-4 INJECTION, SOLUTION INTRAMUSCULAR; INTRAVENOUS
Status: DISCONTINUED | OUTPATIENT
Start: 2018-12-11 | End: 2018-12-13 | Stop reason: HOSPADM

## 2018-12-11 RX ORDER — OMEPRAZOLE 20 MG/1
20 CAPSULE, DELAYED RELEASE ORAL DAILY
Status: SHIPPED | COMMUNITY
End: 2018-12-26

## 2018-12-11 RX ORDER — ONDANSETRON 2 MG/ML
4 INJECTION INTRAMUSCULAR; INTRAVENOUS ONCE
Status: COMPLETED | OUTPATIENT
Start: 2018-12-11 | End: 2018-12-11

## 2018-12-11 RX ORDER — HEPARIN SODIUM 1000 [USP'U]/ML
6000 INJECTION, SOLUTION INTRAVENOUS; SUBCUTANEOUS ONCE
Status: COMPLETED | OUTPATIENT
Start: 2018-12-11 | End: 2018-12-11

## 2018-12-11 RX ORDER — ASPIRIN 325 MG
325 TABLET ORAL ONCE
Status: COMPLETED | OUTPATIENT
Start: 2018-12-11 | End: 2018-12-11

## 2018-12-11 RX ORDER — BISACODYL 10 MG
10 SUPPOSITORY, RECTAL RECTAL
Status: DISCONTINUED | OUTPATIENT
Start: 2018-12-11 | End: 2018-12-13 | Stop reason: HOSPADM

## 2018-12-11 RX ORDER — NITROGLYCERIN 0.4 MG/1
0.4 TABLET SUBLINGUAL
Status: DISCONTINUED | OUTPATIENT
Start: 2018-12-11 | End: 2018-12-13 | Stop reason: HOSPADM

## 2018-12-11 RX ORDER — AMOXICILLIN 250 MG
2 CAPSULE ORAL 2 TIMES DAILY
Status: DISCONTINUED | OUTPATIENT
Start: 2018-12-11 | End: 2018-12-13 | Stop reason: HOSPADM

## 2018-12-11 RX ORDER — POLYETHYLENE GLYCOL 3350 17 G/17G
1 POWDER, FOR SOLUTION ORAL
Status: DISCONTINUED | OUTPATIENT
Start: 2018-12-11 | End: 2018-12-13 | Stop reason: HOSPADM

## 2018-12-11 RX ORDER — ONDANSETRON 4 MG/1
4 TABLET, ORALLY DISINTEGRATING ORAL EVERY 4 HOURS PRN
Status: DISCONTINUED | OUTPATIENT
Start: 2018-12-11 | End: 2018-12-13 | Stop reason: HOSPADM

## 2018-12-11 RX ORDER — HEPARIN SODIUM 1000 [USP'U]/ML
3200 INJECTION, SOLUTION INTRAVENOUS; SUBCUTANEOUS PRN
Status: DISCONTINUED | OUTPATIENT
Start: 2018-12-11 | End: 2018-12-11 | Stop reason: HOSPADM

## 2018-12-11 RX ORDER — HEPARIN SODIUM 1000 [USP'U]/ML
3200 INJECTION, SOLUTION INTRAVENOUS; SUBCUTANEOUS PRN
Status: DISCONTINUED | OUTPATIENT
Start: 2018-12-11 | End: 2018-12-12

## 2018-12-11 RX ADMIN — METOPROLOL TARTRATE 5 MG: 1 INJECTION, SOLUTION INTRAVENOUS at 15:58

## 2018-12-11 RX ADMIN — ONDANSETRON 4 MG: 2 INJECTION INTRAMUSCULAR; INTRAVENOUS at 23:15

## 2018-12-11 RX ADMIN — NITROGLYCERIN 0.5 INCH: 20 OINTMENT TOPICAL at 15:37

## 2018-12-11 RX ADMIN — ONDANSETRON 4 MG: 2 INJECTION INTRAMUSCULAR; INTRAVENOUS at 15:37

## 2018-12-11 RX ADMIN — MORPHINE SULFATE 4 MG: 4 INJECTION INTRAVENOUS at 15:37

## 2018-12-11 RX ADMIN — MORPHINE SULFATE 4 MG: 10 INJECTION INTRAVENOUS at 18:45

## 2018-12-11 RX ADMIN — HEPARIN SODIUM 6000 UNITS: 1000 INJECTION, SOLUTION INTRAVENOUS; SUBCUTANEOUS at 16:01

## 2018-12-11 RX ADMIN — HEPARIN SODIUM 25000 UNITS: 5000 INJECTION, SOLUTION INTRAVENOUS at 18:56

## 2018-12-11 RX ADMIN — ASPIRIN 325 MG ORAL TABLET 325 MG: 325 PILL ORAL at 15:33

## 2018-12-11 ASSESSMENT — COGNITIVE AND FUNCTIONAL STATUS - GENERAL
CLIMB 3 TO 5 STEPS WITH RAILING: A LITTLE
MOVING TO AND FROM BED TO CHAIR: A LITTLE
SUGGESTED CMS G CODE MODIFIER DAILY ACTIVITY: CH
DAILY ACTIVITIY SCORE: 24

## 2018-12-11 ASSESSMENT — ENCOUNTER SYMPTOMS
PALPITATIONS: 0
DIAPHORESIS: 1
SHORTNESS OF BREATH: 0
SEIZURES: 0
SORE THROAT: 0
COUGH: 0
BACK PAIN: 0
VOMITING: 0
FLANK PAIN: 0
DIARRHEA: 0
NAUSEA: 0
BRUISES/BLEEDS EASILY: 0
FEVER: 0
CHILLS: 0
BLOOD IN STOOL: 0
WHEEZING: 0
ABDOMINAL PAIN: 0
FOCAL WEAKNESS: 0
BLURRED VISION: 0
MYALGIAS: 0
HEADACHES: 0
SPUTUM PRODUCTION: 0
NECK PAIN: 0
DIZZINESS: 0

## 2018-12-11 ASSESSMENT — COPD QUESTIONNAIRES
IN THE PAST 12 MONTHS DO YOU DO LESS THAN YOU USED TO BECAUSE OF YOUR BREATHING PROBLEMS: DISAGREE/UNSURE
DO YOU EVER COUGH UP ANY MUCUS OR PHLEGM?: NO/ONLY WITH OCCASIONAL COLDS OR INFECTIONS
DURING THE PAST 4 WEEKS HOW MUCH DID YOU FEEL SHORT OF BREATH: NONE/LITTLE OF THE TIME
COPD SCREENING SCORE: 0
DURING THE PAST 4 WEEKS HOW MUCH DID YOU FEEL SHORT OF BREATH: NONE/LITTLE OF THE TIME
COPD SCREENING SCORE: 0
HAVE YOU SMOKED AT LEAST 100 CIGARETTES IN YOUR ENTIRE LIFE: NO/DON'T KNOW
HAVE YOU SMOKED AT LEAST 100 CIGARETTES IN YOUR ENTIRE LIFE: NO/DON'T KNOW
DO YOU EVER COUGH UP ANY MUCUS OR PHLEGM?: NO/ONLY WITH OCCASIONAL COLDS OR INFECTIONS

## 2018-12-11 ASSESSMENT — LIFESTYLE VARIABLES
EVER FELT BAD OR GUILTY ABOUT YOUR DRINKING: NO
ON A TYPICAL DAY WHEN YOU DRINK ALCOHOL HOW MANY DRINKS DO YOU HAVE: 3
TOTAL SCORE: 0
EVER HAD A DRINK FIRST THING IN THE MORNING TO STEADY YOUR NERVES TO GET RID OF A HANGOVER: NO
TOTAL SCORE: 0
ALCOHOL_USE: YES
HOW MANY TIMES IN THE PAST YEAR HAVE YOU HAD 5 OR MORE DRINKS IN A DAY: 3
HAVE YOU EVER FELT YOU SHOULD CUT DOWN ON YOUR DRINKING: NO
CONSUMPTION TOTAL: POSITIVE
EVER_SMOKED: NEVER
HAVE PEOPLE ANNOYED YOU BY CRITICIZING YOUR DRINKING: NO
TOTAL SCORE: 0
AVERAGE NUMBER OF DAYS PER WEEK YOU HAVE A DRINK CONTAINING ALCOHOL: 7
EVER_SMOKED: NEVER

## 2018-12-11 ASSESSMENT — PATIENT HEALTH QUESTIONNAIRE - PHQ9
2. FEELING DOWN, DEPRESSED, IRRITABLE, OR HOPELESS: NOT AT ALL
SUM OF ALL RESPONSES TO PHQ9 QUESTIONS 1 AND 2: 0
1. LITTLE INTEREST OR PLEASURE IN DOING THINGS: NOT AT ALL

## 2018-12-11 ASSESSMENT — PAIN SCALES - GENERAL
PAINLEVEL_OUTOF10: 0
PAINLEVEL_OUTOF10: 0
PAINLEVEL_OUTOF10: 6

## 2018-12-11 NOTE — ED NOTES
Assumed patient care. Pt assesement done.  Plan of care reviewed with patient. Pt was medicated per ERP orders.

## 2018-12-11 NOTE — ED NOTES
Med rec updated and complete  Allergies reviewed  Pt reports that he has not been able to  his LISINOPRIL 10MG or his SUCRALFATE 1GM.  Pt reports no antibiotics in the last 30 days.  Pt reports no prescription medications or vitamins.

## 2018-12-11 NOTE — ED PROVIDER NOTES
"ED Provider Note    CHIEF COMPLAINT  Chief Complaint   Patient presents with   • Chest Pain     Woke up with CP Sunday night. Seen at  and had normal EKG. Pt was at work today and reports the same pain came back but \"way worst.\" Left side chest. Described as pressure. Denies SOB.        HPI  Jose Juan Hatch is a 43 y.o. male who presents to the emergency department complaining of chest pain.  The patient woke up about 1 in the morning on Sunday with chest pain.  The pain is in the center of his chest described as pressure.  Not sudden onset tearing pain.  I will seem to go away he went to urgent care yesterday told him it was likely GERD.  He had a normal EKG then today or his pain came back.  His anterior chest pain.  Described as pressure does not radiate.  No shortness breath nausea vomiting diaphoresis.  No tearing pain.  Not exertional or positional.  No previous cardiac history.  Has a history of high blood pressure does not take medicines.  No history of tobacco or drug abuse.  Denies any other aggravating leaving factors or associated complaints per    REVIEW OF SYSTEMS  See HPI for further details. All other systems are negative.    PAST MEDICAL HISTORY  Past Medical History:   Diagnosis Date   • Acid reflux    • Kidney stone        FAMILY HISTORY  History reviewed. No pertinent family history.    SOCIAL HISTORY  Social History     Social History   • Marital status:      Spouse name: N/A   • Number of children: N/A   • Years of education: N/A     Social History Main Topics   • Smoking status: Never Smoker   • Smokeless tobacco: Never Used   • Alcohol use 4.2 oz/week     7 Shots of liquor per week      Comment: occasional 2 drinks   • Drug use: No   • Sexual activity: Not on file     Other Topics Concern   • Not on file     Social History Narrative   • No narrative on file       SURGICAL HISTORY  History reviewed. No pertinent surgical history.    CURRENT MEDICATIONS  Home Medications     Reviewed by " "Jessie Grullon, PhT (Pharmacy Tech) on 12/11/18 at 1545  Med List Status: Complete   Medication Last Dose Status   ibuprofen (MOTRIN) 200 MG Tab 12/9/2018 Active   omeprazole (PRILOSEC) 20 MG delayed-release capsule 12/11/2018 Active                ALLERGIES  No Known Allergies    PHYSICAL EXAM  VITAL SIGNS: BP (!) 186/104   Pulse 71   Temp 36.8 °C (98.2 °F) (Temporal)   Resp 18   Ht 1.702 m (5' 7\")   Wt 101.9 kg (224 lb 10.4 oz)   SpO2 98%   BMI 35.18 kg/m²    Constitutional: Well developed, Well nourished, No acute distress, Non-toxic appearance.   HENT: Normocephalic, Atraumatic, Bilateral external ears normal, Oropharynx moist, No oral exudates, Nose normal.   Eyes: PERRL, EOMI, Conjunctiva normal, No discharge.   Neck: Normal range of motion, No tenderness, Supple, No stridor.   Lymphatic: No lymphadenopathy noted.   Cardiovascular: Normal heart rate, Normal rhythm, No murmurs, No rubs, No gallops.   Thorax & Lungs: Normal breath sounds, No respiratory distress, No wheezing,  Abdomen: Bowel sounds normal, Soft, No tenderness,  Skin: Warm, Dry, No erythema, No rash.   Back: No tenderness, No CVA tenderness.   Musculoskeletal: Good range of motion in all major joints.  No asymmetric edema good pulses.  Neurologic: Alert,  No focal deficits noted.   Psychiatric: Affect normal,    Results for orders placed or performed during the hospital encounter of 12/11/18   Troponin   Result Value Ref Range    Troponin I 1.10 (H) 0.00 - 0.04 ng/mL   Btype Natriuretic Peptide   Result Value Ref Range    B Natriuretic Peptide 54 0 - 100 pg/mL   CBC with Differential   Result Value Ref Range    WBC 8.4 4.8 - 10.8 K/uL    RBC 5.28 4.70 - 6.10 M/uL    Hemoglobin 17.0 14.0 - 18.0 g/dL    Hematocrit 48.4 42.0 - 52.0 %    MCV 91.7 81.4 - 97.8 fL    MCH 32.2 27.0 - 33.0 pg    MCHC 35.1 33.7 - 35.3 g/dL    RDW 39.9 35.9 - 50.0 fL    Platelet Count 210 164 - 446 K/uL    MPV 9.3 9.0 - 12.9 fL    Neutrophils-Polys 56.50 44.00 - " 72.00 %    Lymphocytes 35.50 22.00 - 41.00 %    Monocytes 7.20 0.00 - 13.40 %    Eosinophils 0.20 0.00 - 6.90 %    Basophils 0.40 0.00 - 1.80 %    Immature Granulocytes 0.20 0.00 - 0.90 %    Nucleated RBC 0.00 /100 WBC    Neutrophils (Absolute) 4.75 1.82 - 7.42 K/uL    Lymphs (Absolute) 2.99 1.00 - 4.80 K/uL    Monos (Absolute) 0.61 0.00 - 0.85 K/uL    Eos (Absolute) 0.02 0.00 - 0.51 K/uL    Baso (Absolute) 0.03 0.00 - 0.12 K/uL    Immature Granulocytes (abs) 0.02 0.00 - 0.11 K/uL    NRBC (Absolute) 0.00 K/uL   Complete Metabolic Panel (CMP)   Result Value Ref Range    Sodium 134 (L) 135 - 145 mmol/L    Potassium 3.9 3.6 - 5.5 mmol/L    Chloride 101 96 - 112 mmol/L    Co2 25 20 - 33 mmol/L    Anion Gap 8.0 0.0 - 11.9    Glucose 109 (H) 65 - 99 mg/dL    Bun 16 8 - 22 mg/dL    Creatinine 1.14 0.50 - 1.40 mg/dL    Calcium 9.2 8.4 - 10.2 mg/dL    AST(SGOT) 50 (H) 12 - 45 U/L    ALT(SGPT) 53 (H) 2 - 50 U/L    Alkaline Phosphatase 93 30 - 99 U/L    Total Bilirubin 0.8 0.1 - 1.5 mg/dL    Albumin 4.9 3.2 - 4.9 g/dL    Total Protein 9.1 (H) 6.0 - 8.2 g/dL    Globulin 4.2 (H) 1.9 - 3.5 g/dL    A-G Ratio 1.2 g/dL   Prothrombin Time   Result Value Ref Range    PT 14.3 12.0 - 14.6 sec    INR 1.12 0.87 - 1.13   APTT   Result Value Ref Range    APTT 30.7 24.7 - 36.0 sec   Lipase   Result Value Ref Range    Lipase 24 7 - 58 U/L   ESTIMATED GFR   Result Value Ref Range    GFR If African American >60 >60 mL/min/1.73 m 2    GFR If Non African American >60 >60 mL/min/1.73 m 2   EKG   Result Value Ref Range    Report       [facility] Emergency Dept.    Test Date:  2018  Pt Name:    SANCHO DIAZ                   Department: EDS  MRN:        [id number]                      Room:  Gender:     Male                         Technician: PAVEL  :        1975                   Requested By:ER TRIAGE PROTOCOL  Order #:    [id number]                    Reading MD: OCTAVIO LEUNG. AMD    Measurements  Intervals                                 Axis  Rate:       74                           P:          50  CO:         136                          QRS:        64  QRSD:       96                           T:          32  QT:         392  QTc:        435    Interpretive Statements  SINUS RHYTHM  BASELINE WANDER IN LEAD(S) V2  No previous ECG available for comparison    Electronically Signed On 2018 15:13:08 PST by OCTAVIO LEUNG. AMD     EKG   Result Value Ref Range    Report       [facility] Emergency Dept.    Test Date:  2018  Pt Name:    SANCHO DIAZ                   Department: [facility]  MRN:        [id number]                      [facility]  Gender:     Male                         Technician: KRIS  :        1975                   Requested By:OCTAVIO LEUNG  Order #:    [id number]                    Reading MD:    Measurements  Intervals                                Axis  Rate:       75                           P:          58  CO:         151                          QRS:        66  QRSD:       103                          T:          42  QT:         400  QTc:        447    Interpretive Statements  Sinus rhythm  Baseline wander in lead(s) V4  Compared to ECG 2018 14:02:53  No significant changes          RADIOLOGY/PROCEDURES  DX-CHEST-LIMITED (1 VIEW)   Final Result      No evidence of acute cardiopulmonary process.          COURSE & MEDICAL DECISION MAKING  Pertinent Labs & Imaging studies reviewed. (See chart for details)  The patient presents emergency department for chest pain.  He was worked up per protocol and found to have an elevated troponin.  2 EKGs here do not show STEMI.  He still has intermittent chest pain although he is pain-free now.  History, physical exam, and, labs are very concerning for acute coronary syndrome.  He does not have a STEMI.  Labs show an elevated troponin.  Given aspirin.  He also given some morphine and Zofran and Nitropaste.  His  blood pressure remains high.    Is concerned about a PE, or dissection we does not have a history of physical exam to suggest either of those etiologies.  His blood pressure remains high, he is given IV metoprolol 5 mg.    Once we found his troponin was elevated I spoke with the cardiologist on call here Dr. Maloney.  He recommends transfer to the main emergency department to consider an angiogram.  I spoke with Dr. Azevedo who is accepted the patient in transfer.  Currently pain-free he is received a heparin bolus aspirin morphine and metoprolol and is transferred in guarded condition.    Indication for transfer is possible interventional cardiology and cardiac angiogram    FINAL IMPRESSION  1. Acute coronary syndrome (HCC)      2.  Critical care time of 35 minutes no procedures  2.   3.         Electronically signed by: Anthony Sandhu, 12/11/2018 3:08 PM

## 2018-12-12 ENCOUNTER — APPOINTMENT (OUTPATIENT)
Dept: CARDIOLOGY | Facility: MEDICAL CENTER | Age: 43
DRG: 247 | End: 2018-12-12
Attending: NURSE PRACTITIONER
Payer: COMMERCIAL

## 2018-12-12 PROBLEM — I25.110 CORONARY ARTERY DISEASE INVOLVING NATIVE CORONARY ARTERY OF NATIVE HEART WITH UNSTABLE ANGINA PECTORIS (HCC): Status: ACTIVE | Noted: 2018-12-12

## 2018-12-12 LAB
AMPHET UR QL SCN: NEGATIVE
APTT PPP: 54.6 SEC (ref 24.7–36)
APTT PPP: 91.5 SEC (ref 24.7–36)
BARBITURATES UR QL SCN: NEGATIVE
BENZODIAZ UR QL SCN: NEGATIVE
BZE UR QL SCN: NEGATIVE
CANNABINOIDS UR QL SCN: NEGATIVE
CHOLEST SERPL-MCNC: 296 MG/DL (ref 100–199)
EST. AVERAGE GLUCOSE BLD GHB EST-MCNC: 123 MG/DL
HBA1C MFR BLD: 5.9 % (ref 0–5.6)
HDLC SERPL-MCNC: 62 MG/DL
LDLC SERPL CALC-MCNC: 210 MG/DL
LV EJECT FRACT  99904: 65
LV EJECT FRACT MOD 2C 99903: 62.84
LV EJECT FRACT MOD 4C 99902: 64.37
LV EJECT FRACT MOD BP 99901: 61.84
METHADONE UR QL SCN: NEGATIVE
OPIATES UR QL SCN: POSITIVE
OXYCODONE UR QL SCN: NEGATIVE
PCP UR QL SCN: NEGATIVE
PROPOXYPH UR QL SCN: NEGATIVE
TRIGL SERPL-MCNC: 119 MG/DL (ref 0–149)
TROPONIN I SERPL-MCNC: 4.73 NG/ML (ref 0–0.04)
TROPONIN I SERPL-MCNC: 4.96 NG/ML (ref 0–0.04)

## 2018-12-12 PROCEDURE — 700117 HCHG RX CONTRAST REV CODE 255: Performed by: INTERNAL MEDICINE

## 2018-12-12 PROCEDURE — C1769 GUIDE WIRE: HCPCS

## 2018-12-12 PROCEDURE — 93458 L HRT ARTERY/VENTRICLE ANGIO: CPT | Mod: 26,59 | Performed by: INTERNAL MEDICINE

## 2018-12-12 PROCEDURE — 304952 HCHG R 2 PADS

## 2018-12-12 PROCEDURE — 93458 L HRT ARTERY/VENTRICLE ANGIO: CPT | Mod: XU

## 2018-12-12 PROCEDURE — C1887 CATHETER, GUIDING: HCPCS

## 2018-12-12 PROCEDURE — 80048 BASIC METABOLIC PNL TOTAL CA: CPT

## 2018-12-12 PROCEDURE — 307093 HCHG TR BAND RADIAL

## 2018-12-12 PROCEDURE — 700102 HCHG RX REV CODE 250 W/ 637 OVERRIDE(OP)

## 2018-12-12 PROCEDURE — 700105 HCHG RX REV CODE 258: Performed by: INTERNAL MEDICINE

## 2018-12-12 PROCEDURE — 80307 DRUG TEST PRSMV CHEM ANLYZR: CPT

## 2018-12-12 PROCEDURE — 700102 HCHG RX REV CODE 250 W/ 637 OVERRIDE(OP): Performed by: NURSE PRACTITIONER

## 2018-12-12 PROCEDURE — 80061 LIPID PANEL: CPT

## 2018-12-12 PROCEDURE — A9270 NON-COVERED ITEM OR SERVICE: HCPCS | Performed by: NURSE PRACTITIONER

## 2018-12-12 PROCEDURE — C1874 STENT, COATED/COV W/DEL SYS: HCPCS

## 2018-12-12 PROCEDURE — C1894 INTRO/SHEATH, NON-LASER: HCPCS

## 2018-12-12 PROCEDURE — 93306 TTE W/DOPPLER COMPLETE: CPT

## 2018-12-12 PROCEDURE — 99152 MOD SED SAME PHYS/QHP 5/>YRS: CPT | Performed by: INTERNAL MEDICINE

## 2018-12-12 PROCEDURE — 027034Z DILATION OF CORONARY ARTERY, ONE ARTERY WITH DRUG-ELUTING INTRALUMINAL DEVICE, PERCUTANEOUS APPROACH: ICD-10-PCS | Performed by: INTERNAL MEDICINE

## 2018-12-12 PROCEDURE — A9270 NON-COVERED ITEM OR SERVICE: HCPCS | Performed by: INTERNAL MEDICINE

## 2018-12-12 PROCEDURE — 700117 HCHG RX CONTRAST REV CODE 255: Performed by: NURSE PRACTITIONER

## 2018-12-12 PROCEDURE — 700102 HCHG RX REV CODE 250 W/ 637 OVERRIDE(OP): Performed by: INTERNAL MEDICINE

## 2018-12-12 PROCEDURE — 93306 TTE W/DOPPLER COMPLETE: CPT | Mod: 26 | Performed by: INTERNAL MEDICINE

## 2018-12-12 PROCEDURE — 85027 COMPLETE CBC AUTOMATED: CPT

## 2018-12-12 PROCEDURE — 85730 THROMBOPLASTIN TIME PARTIAL: CPT

## 2018-12-12 PROCEDURE — 99233 SBSQ HOSP IP/OBS HIGH 50: CPT | Performed by: HOSPITALIST

## 2018-12-12 PROCEDURE — 36415 COLL VENOUS BLD VENIPUNCTURE: CPT

## 2018-12-12 PROCEDURE — 700111 HCHG RX REV CODE 636 W/ 250 OVERRIDE (IP): Performed by: EMERGENCY MEDICINE

## 2018-12-12 PROCEDURE — B2151ZZ FLUOROSCOPY OF LEFT HEART USING LOW OSMOLAR CONTRAST: ICD-10-PCS | Performed by: INTERNAL MEDICINE

## 2018-12-12 PROCEDURE — 84484 ASSAY OF TROPONIN QUANT: CPT

## 2018-12-12 PROCEDURE — 99232 SBSQ HOSP IP/OBS MODERATE 35: CPT | Performed by: INTERNAL MEDICINE

## 2018-12-12 PROCEDURE — A9270 NON-COVERED ITEM OR SERVICE: HCPCS

## 2018-12-12 PROCEDURE — C9600 PERC DRUG-EL COR STENT SING: HCPCS | Mod: LD

## 2018-12-12 PROCEDURE — 700111 HCHG RX REV CODE 636 W/ 250 OVERRIDE (IP): Performed by: INTERNAL MEDICINE

## 2018-12-12 PROCEDURE — 92928 PRQ TCAT PLMT NTRAC ST 1 LES: CPT | Mod: LD | Performed by: INTERNAL MEDICINE

## 2018-12-12 PROCEDURE — 770020 HCHG ROOM/CARE - TELE (206)

## 2018-12-12 PROCEDURE — C1725 CATH, TRANSLUMIN NON-LASER: HCPCS

## 2018-12-12 PROCEDURE — 302135 SEQUENTIAL COMPRESSION MACHINE: Performed by: HOSPITALIST

## 2018-12-12 PROCEDURE — 4A023N7 MEASUREMENT OF CARDIAC SAMPLING AND PRESSURE, LEFT HEART, PERCUTANEOUS APPROACH: ICD-10-PCS | Performed by: INTERNAL MEDICINE

## 2018-12-12 PROCEDURE — 99153 MOD SED SAME PHYS/QHP EA: CPT

## 2018-12-12 PROCEDURE — 700101 HCHG RX REV CODE 250

## 2018-12-12 PROCEDURE — 700111 HCHG RX REV CODE 636 W/ 250 OVERRIDE (IP)

## 2018-12-12 PROCEDURE — 99152 MOD SED SAME PHYS/QHP 5/>YRS: CPT

## 2018-12-12 PROCEDURE — B2111ZZ FLUOROSCOPY OF MULTIPLE CORONARY ARTERIES USING LOW OSMOLAR CONTRAST: ICD-10-PCS | Performed by: INTERNAL MEDICINE

## 2018-12-12 PROCEDURE — 93010 ELECTROCARDIOGRAM REPORT: CPT | Performed by: INTERNAL MEDICINE

## 2018-12-12 PROCEDURE — 360979 HCHG DIAGNOSTIC CATH

## 2018-12-12 PROCEDURE — 93005 ELECTROCARDIOGRAM TRACING: CPT | Performed by: INTERNAL MEDICINE

## 2018-12-12 RX ORDER — CLOPIDOGREL 300 MG/1
TABLET, FILM COATED ORAL
Status: COMPLETED
Start: 2018-12-12 | End: 2018-12-12

## 2018-12-12 RX ORDER — SODIUM CHLORIDE 9 MG/ML
INJECTION, SOLUTION INTRAVENOUS CONTINUOUS
Status: DISCONTINUED | OUTPATIENT
Start: 2018-12-12 | End: 2018-12-13 | Stop reason: HOSPADM

## 2018-12-12 RX ORDER — CLOPIDOGREL BISULFATE 75 MG/1
75 TABLET ORAL DAILY
Status: DISCONTINUED | OUTPATIENT
Start: 2018-12-13 | End: 2018-12-13 | Stop reason: HOSPADM

## 2018-12-12 RX ORDER — ROSUVASTATIN CALCIUM 20 MG/1
20 TABLET, COATED ORAL EVERY EVENING
Status: DISCONTINUED | OUTPATIENT
Start: 2018-12-12 | End: 2018-12-12

## 2018-12-12 RX ORDER — HEPARIN SODIUM,PORCINE 1000/ML
VIAL (ML) INJECTION
Status: COMPLETED
Start: 2018-12-12 | End: 2018-12-12

## 2018-12-12 RX ORDER — ONDANSETRON 2 MG/ML
INJECTION INTRAMUSCULAR; INTRAVENOUS
Status: COMPLETED
Start: 2018-12-12 | End: 2018-12-12

## 2018-12-12 RX ORDER — LIDOCAINE HYDROCHLORIDE 20 MG/ML
INJECTION, SOLUTION INFILTRATION; PERINEURAL
Status: COMPLETED
Start: 2018-12-12 | End: 2018-12-12

## 2018-12-12 RX ORDER — ROSUVASTATIN CALCIUM 20 MG/1
40 TABLET, COATED ORAL EVERY EVENING
Status: DISCONTINUED | OUTPATIENT
Start: 2018-12-12 | End: 2018-12-13 | Stop reason: HOSPADM

## 2018-12-12 RX ORDER — MIDAZOLAM HYDROCHLORIDE 1 MG/ML
INJECTION INTRAMUSCULAR; INTRAVENOUS
Status: COMPLETED
Start: 2018-12-12 | End: 2018-12-12

## 2018-12-12 RX ORDER — VERAPAMIL HYDROCHLORIDE 2.5 MG/ML
INJECTION, SOLUTION INTRAVENOUS
Status: COMPLETED
Start: 2018-12-12 | End: 2018-12-12

## 2018-12-12 RX ORDER — EZETIMIBE 10 MG/1
10 TABLET ORAL DAILY
Status: DISCONTINUED | OUTPATIENT
Start: 2018-12-12 | End: 2018-12-13 | Stop reason: HOSPADM

## 2018-12-12 RX ORDER — CLOPIDOGREL BISULFATE 75 MG/1
600 TABLET ORAL ONCE
Status: DISCONTINUED | OUTPATIENT
Start: 2018-12-12 | End: 2018-12-12

## 2018-12-12 RX ADMIN — VERAPAMIL HYDROCHLORIDE 5 MG: 2.5 INJECTION, SOLUTION INTRAVENOUS at 16:03

## 2018-12-12 RX ADMIN — METOPROLOL TARTRATE 25 MG: 25 TABLET, FILM COATED ORAL at 05:39

## 2018-12-12 RX ADMIN — METOPROLOL TARTRATE 25 MG: 25 TABLET, FILM COATED ORAL at 17:30

## 2018-12-12 RX ADMIN — ONDANSETRON 4 MG: 4 TABLET, ORALLY DISINTEGRATING ORAL at 18:47

## 2018-12-12 RX ADMIN — ASPIRIN 81 MG: 81 TABLET, COATED ORAL at 05:39

## 2018-12-12 RX ADMIN — CLOPIDOGREL BISULFATE 600 MG: 300 TABLET, FILM COATED ORAL at 16:38

## 2018-12-12 RX ADMIN — EZETIMIBE 10 MG: 10 TABLET ORAL at 10:42

## 2018-12-12 RX ADMIN — OMEPRAZOLE 20 MG: 20 CAPSULE, DELAYED RELEASE ORAL at 05:39

## 2018-12-12 RX ADMIN — SODIUM CHLORIDE: 9 INJECTION, SOLUTION INTRAVENOUS at 00:20

## 2018-12-12 RX ADMIN — ONDANSETRON 4 MG: 2 INJECTION INTRAMUSCULAR; INTRAVENOUS at 17:00

## 2018-12-12 RX ADMIN — IOHEXOL 164 ML: 350 INJECTION, SOLUTION INTRAVENOUS at 16:31

## 2018-12-12 RX ADMIN — ROSUVASTATIN CALCIUM 40 MG: 20 TABLET, FILM COATED ORAL at 17:31

## 2018-12-12 RX ADMIN — FENTANYL CITRATE 100 MCG: 50 INJECTION, SOLUTION INTRAMUSCULAR; INTRAVENOUS at 16:03

## 2018-12-12 RX ADMIN — HEPARIN SODIUM 2000 UNITS: 1000 INJECTION, SOLUTION INTRAVENOUS; SUBCUTANEOUS at 16:03

## 2018-12-12 RX ADMIN — BIVALIRUDIN IN 0.9% SODIUM CHLORIDE INJECTION 250 MG: 250 INJECTION INTRAVENOUS at 16:28

## 2018-12-12 RX ADMIN — MORPHINE SULFATE 2 MG: 10 INJECTION INTRAVENOUS at 17:30

## 2018-12-12 RX ADMIN — HUMAN ALBUMIN MICROSPHERES AND PERFLUTREN 3 ML: 10; .22 INJECTION, SOLUTION INTRAVENOUS at 16:30

## 2018-12-12 RX ADMIN — HEPARIN SODIUM 3200 UNITS: 1000 INJECTION, SOLUTION INTRAVENOUS; SUBCUTANEOUS at 03:08

## 2018-12-12 RX ADMIN — NITROGLYCERIN 10 ML: 20 INJECTION INTRAVENOUS at 16:03

## 2018-12-12 RX ADMIN — ATORVASTATIN CALCIUM 80 MG: 80 TABLET, FILM COATED ORAL at 00:17

## 2018-12-12 RX ADMIN — STANDARDIZED SENNA CONCENTRATE AND DOCUSATE SODIUM 2 TABLET: 8.6; 5 TABLET, FILM COATED ORAL at 18:52

## 2018-12-12 RX ADMIN — METOPROLOL TARTRATE 25 MG: 25 TABLET, FILM COATED ORAL at 00:17

## 2018-12-12 RX ADMIN — LIDOCAINE HYDROCHLORIDE: 20 INJECTION, SOLUTION INFILTRATION; PERINEURAL at 16:03

## 2018-12-12 RX ADMIN — HEPARIN SODIUM 1450 UNITS/HR: 5000 INJECTION, SOLUTION INTRAVENOUS at 13:42

## 2018-12-12 RX ADMIN — HEPARIN SODIUM: 1000 INJECTION, SOLUTION INTRAVENOUS; SUBCUTANEOUS at 16:04

## 2018-12-12 RX ADMIN — MIDAZOLAM HYDROCHLORIDE 2 MG: 1 INJECTION, SOLUTION INTRAMUSCULAR; INTRAVENOUS at 16:03

## 2018-12-12 RX ADMIN — SODIUM CHLORIDE: 9 INJECTION, SOLUTION INTRAVENOUS at 18:54

## 2018-12-12 ASSESSMENT — ENCOUNTER SYMPTOMS
DIZZINESS: 0
CHILLS: 0
BLOOD IN STOOL: 0
FEVER: 0
TREMORS: 0
ABDOMINAL PAIN: 0
NAUSEA: 0
TINGLING: 0
PALPITATIONS: 0
CHEST TIGHTNESS: 0
VOMITING: 0
SHORTNESS OF BREATH: 0
SENSORY CHANGE: 0

## 2018-12-12 ASSESSMENT — COGNITIVE AND FUNCTIONAL STATUS - GENERAL
CLIMB 3 TO 5 STEPS WITH RAILING: A LITTLE
WALKING IN HOSPITAL ROOM: A LITTLE
SUGGESTED CMS G CODE MODIFIER DAILY ACTIVITY: CH
SUGGESTED CMS G CODE MODIFIER MOBILITY: CJ
MOBILITY SCORE: 22
DAILY ACTIVITIY SCORE: 24

## 2018-12-12 ASSESSMENT — PAIN SCALES - GENERAL
PAINLEVEL_OUTOF10: 0

## 2018-12-12 NOTE — CARE PLAN
Problem: Safety  Goal: Will remain free from falls  Outcome: PROGRESSING AS EXPECTED  Fall risk assessed, and in place.  Patient is stand by assist. Wife at bedside.  Call light with in reach.        Problem: Infection  Goal: Will remain free from infection  Outcome: PROGRESSING AS EXPECTED  Educated patient and family members on hand washing. Standard precautions in place. Assessed patient for s/s of infection.

## 2018-12-12 NOTE — PROGRESS NOTES
Hospital Medicine Daily Progress Note    Date of Service  12/12/2018    Chief Complaint  43 y.o. male with a past medical history of obesity, hypertension, hyperlipidemia who presented 12/11/2018 with chest pain.      Hospital Course    He initially presented to [facility].  He reports substernal chest pain which is squeezing in nature rated at 8/10 intensity associated with diaphoresis.  He denied any relieving or exacerbating factors.  His troponin was elevated at 1.1 > 2.8.  Cardiology was consulted.  The patient was given aspirin and started on IV heparin and transferred to [facility] for cardiac catheterization. EKG on arrival showed sinus rhythm with ST depressions in inferior leads. Chest x-ray interpreted by me reveals no acute cardiopulmonary process. Patient noted to have elevated lipid panel.         Interval Problem Update  12/12--patient chest pain free all day, cardiology on board, plan for cath sometime today. No fevers or chills, no N/V.     Consultants/Specialty  Cardiology    Code Status  Full Code    Disposition  Inpatient    Review of Systems  Review of Systems   Constitutional: Negative for chills and fever.   Cardiovascular: Negative for chest pain.   Gastrointestinal: Negative for nausea and vomiting.   Skin: Negative for itching and rash.   Neurological: Negative for tingling, tremors and sensory change.   All other systems reviewed and are negative.       Physical Exam  Temp:  [36.2 °C (97.2 °F)-36.9 °C (98.4 °F)] 36.4 °C (97.6 °F)  Pulse:  [60-89] 65  Resp:  [15-18] 16  BP: (127-155)/() 142/96    Physical Exam   Constitutional: He is oriented to person, place, and time. No distress.   HENT:   Head: Normocephalic and atraumatic.   Mouth/Throat: Oropharynx is clear and moist.   Eyes: EOM are normal. No scleral icterus.   Neck: Normal range of motion.   Cardiovascular: Normal rate and normal heart sounds.    Pulmonary/Chest: Effort normal. No stridor. No  respiratory distress. He has no wheezes.   Abdominal: Soft. He exhibits no distension. There is no tenderness.   Musculoskeletal: Normal range of motion. He exhibits no tenderness.   Neurological: He is alert and oriented to person, place, and time. No cranial nerve deficit.   Skin: Skin is warm and dry. No rash noted. He is not diaphoretic.   Psychiatric: He has a normal mood and affect. His behavior is normal.   Nursing note and vitals reviewed.      Fluids    Intake/Output Summary (Last 24 hours) at 12/12/18 1515  Last data filed at 12/12/18 0000   Gross per 24 hour   Intake                0 ml   Output               40 ml   Net              -40 ml       Laboratory  Recent Labs      12/11/18   1430  12/11/18   1717   WBC  8.4  9.5   RBC  5.28  4.87   HEMOGLOBIN  17.0  15.9   HEMATOCRIT  48.4  46.1   MCV  91.7  94.7   MCH  32.2  32.6   MCHC  35.1  34.5   RDW  39.9  41.5   PLATELETCT  210  210   MPV  9.3  9.6     Recent Labs      12/11/18   1430  12/11/18   1717   SODIUM  134*  137   POTASSIUM  3.9  3.9   CHLORIDE  101  104   CO2  25  24   GLUCOSE  109*  116*   BUN  16  15   CREATININE  1.14  1.08   CALCIUM  9.2  9.7     Recent Labs      12/11/18   1430  12/12/18   0101  12/12/18   0914   APTT  30.7  54.6*  91.5*   INR  1.12   --    --      Recent Labs      12/11/18   1430  12/11/18   1717   BNPBTYPENAT  54  48     Recent Labs      12/12/18   0028   TRIGLYCERIDE  119   HDL  62   LDL  210*       Imaging  EC-ECHOCARDIOGRAM COMPLETE W/O CONT    (Results Pending)        Assessment/Plan  * NSTEMI (non-ST elevated myocardial infarction) (HCC)- (present on admission)   Assessment & Plan    Patient was started on metoprolol 25 mg twice daily and atorvastatin 80 mg daily  Troponin continues to climb today, peak of 4.9  Lipid panel markedly elevated and given young age familiar component to early CAD likely   Nitro and morphine when necessary for chest pain  Cardiology has been consulted, plan for cardiac catheterization  today       Coronary artery disease involving native coronary artery of native heart with unstable angina pectoris (HCC)   Assessment & Plan    Peak trop 4.9  Elevated lipid panel  Check UDS     HLD (hyperlipidemia)- (present on admission)   Assessment & Plan    Started on atorvastatin 80 mg daily  Likely family component to elevated lipids     Hypertension- (present on admission)   Assessment & Plan    Poorly controlled  Started on metoprolol 25 mg twice daily       Obesity- (present on admission)   Assessment & Plan    Body mass index is 34.49 kg/m².              VTE prophylaxis: heparin

## 2018-12-12 NOTE — H&P
Hospital Medicine History & Physical Note    Date of Service  12/11/2018    Primary Care Physician  Pcp Pt States None    Consultants  Cardiology    Code Status  Full code    Chief Complaint  Chest pain    History of Presenting Illness  43 y.o. male with a past medical history of obesity, hypertension, hyperlipidemia who presented 12/11/2018 with chest pain.  Patient reported intermittent substernal chest pain that started on Sunday.  He went to an urgent care where an EKG was performed which was unremarkable, he was prescribed omeprazole for possible GERD and sent home.  Today at around 12:30 PM his chest pain worsened for which he presented to [facility].  He reports substernal chest pain which is squeezing in nature rated at 8/10 intensity associated with diaphoresis.  He denied any relieving or exacerbating factors.  His troponin was elevated at 1.1 > 2.8.  Cardiology was consulted.  The patient was given aspirin and started on IV heparin and transferred to [facility] for cardiac catheterization. At this time his chest pain has improved.  He does not smoke cigarettes.  He denies any family history of MI.    EKG interpreted by me reveals sinus rhythm with ST depressions in inferior leads  Chest x-ray interpreted by me reveals no acute cardiopulmonary process    Review of Systems  Review of Systems   Constitutional: Positive for diaphoresis. Negative for chills and fever.   HENT: Negative for hearing loss and sore throat.    Eyes: Negative for blurred vision.   Respiratory: Negative for cough, sputum production, shortness of breath and wheezing.    Cardiovascular: Positive for chest pain. Negative for palpitations and leg swelling.   Gastrointestinal: Negative for abdominal pain, blood in stool, diarrhea, nausea and vomiting.   Genitourinary: Negative for dysuria, flank pain and urgency.   Musculoskeletal: Negative for back pain, joint pain, myalgias and neck pain.   Skin: Negative for  rash.   Neurological: Negative for dizziness, focal weakness, seizures and headaches.   Endo/Heme/Allergies: Does not bruise/bleed easily.   Psychiatric/Behavioral: Negative for suicidal ideas.   All other systems reviewed and are negative.      Past Medical History   has a past medical history of Acid reflux and Kidney stone.    Surgical History  No pertinent surgical history    Family History  No pertinent family history    Social History   reports that he has never smoked. He has never used smokeless tobacco. He reports that he drinks about 4.2 oz of alcohol per week . He reports that he does not use drugs.    Allergies  No Known Allergies    Medications  Prior to Admission Medications   Prescriptions Last Dose Informant Patient Reported? Taking?   ibuprofen (MOTRIN) 200 MG Tab 12/9/2018 at AM Patient Yes No   Sig: Take 400 mg by mouth every 6 hours as needed for Mild Pain.   omeprazole (PRILOSEC) 20 MG delayed-release capsule 12/11/2018 at 0715 Patient Yes No   Sig: Take 20 mg by mouth every day.      Facility-Administered Medications: None       Physical Exam  Temp:  [36.7 °C (98.1 °F)-36.8 °C (98.2 °F)] 36.7 °C (98.1 °F)  Pulse:  [71-89] 72  Resp:  [8-27] 11  BP: (169-186)/(103-104) 169/103    Physical Exam   Constitutional: He is oriented to person, place, and time. He appears well-developed and well-nourished. No distress.   Obese   HENT:   Head: Normocephalic and atraumatic.   Mouth/Throat: Oropharynx is clear and moist.   Eyes: Pupils are equal, round, and reactive to light. Conjunctivae are normal. No scleral icterus.   Neck: Normal range of motion. Neck supple.   Cardiovascular: Normal rate, regular rhythm and normal heart sounds.    Pulmonary/Chest: Effort normal and breath sounds normal. No respiratory distress. He has no wheezes. He has no rales.   Abdominal: Soft. Bowel sounds are normal. He exhibits no distension. There is no tenderness. There is no rebound.   Musculoskeletal: Normal range of  motion. He exhibits no edema or tenderness.   Lymphadenopathy:     He has no cervical adenopathy.   Neurological: He is alert and oriented to person, place, and time. No cranial nerve deficit. Coordination normal.   Skin: Skin is warm. No rash noted.   Psychiatric: He has a normal mood and affect. His behavior is normal.   Nursing note and vitals reviewed.      Laboratory:  Recent Labs      12/11/18   1430  12/11/18   1717   WBC  8.4  9.5   RBC  5.28  4.87   HEMOGLOBIN  17.0  15.9   HEMATOCRIT  48.4  46.1   MCV  91.7  94.7   MCH  32.2  32.6   MCHC  35.1  34.5   RDW  39.9  41.5   PLATELETCT  210  210   MPV  9.3  9.6     Recent Labs      12/11/18   1430  12/11/18   1717   SODIUM  134*  137   POTASSIUM  3.9  3.9   CHLORIDE  101  104   CO2  25  24   GLUCOSE  109*  116*   BUN  16  15   CREATININE  1.14  1.08   CALCIUM  9.2  9.7     Recent Labs      12/11/18   1430  12/11/18   1717   ALTSGPT  53*  44   ASTSGOT  50*  41   ALKPHOSPHAT  93  87   TBILIRUBIN  0.8  0.8   LIPASE  24   --    GLUCOSE  109*  116*     Recent Labs      12/11/18   1430   APTT  30.7   INR  1.12     Recent Labs      12/11/18   1430  12/11/18   1717   BNPBTYPENAT  54  48         Recent Labs      12/11/18   1430  12/11/18   1717   TROPONINI  1.10*  2.88*       Urinalysis:    No results found     Imaging:  No orders to display         Assessment/Plan:  I anticipate this patient will require at least two midnights for appropriate medical management, necessitating inpatient admission.    NSTEMI (non-ST elevated myocardial infarction) ([facility])- (present on admission)   Assessment & Plan    Patient will be admitted to the telemetry unit with close cardiac monitoring with serial EKG   Patient has been given full dose of aspirin and is started on IV heparin, monitor APTT  Patient is started on metoprolol 25 mg twice daily and atorvastatin 80 mg daily  Check 2D echo, lipid panel, TSH and hemoglobin A1c  Nitro and morphine when necessary for chest pain  Cardiology  has been consulted, plan for cardiac catheterization in the morning  N.p.o. at midnight       Obesity- (present on admission)   Assessment & Plan    Body mass index is 34.49 kg/m².  Pt educated on the increase of morbidity and mortality associated with excess weight including DM, Heart Disease, HTN, stroke, and sleep apnea.  Pt advised weight loss of 5% through reduced calorie, low carb diet and 150 mins of exercise a week       HLD (hyperlipidemia)- (present on admission)   Assessment & Plan    Started on atorvastatin 80 mg daily     Hypertension- (present on admission)   Assessment & Plan    Uncontrolled  Started on metoprolol 25 mg twice daily           VTE prophylaxis: heparin

## 2018-12-12 NOTE — PROGRESS NOTES
Cardiology Follow Up Progress Note    Date of Service  12/12/2018    Attending Physician  Kenneth Baptiste M.D.    Chief Complaint   Chest pain     Consulted for NSTEMI.    NAIMA Hatch is a 43 y.o. male admitted 12/11/2018 with substernal chest pain X 2 days and elevated troponin.     Past medical history significant for hypertension, ETOH use, acid reflux and nephrolithiasis.     Interim Events  12/12/18:  Resting in bed without any complaints. Anxious to have his angiogram completed. No significant events overnight per nursing.     Monitor: SR 61-71 with rare PAC.     Review of Systems  Review of Systems   Constitutional: Negative for fever.   Respiratory: Negative for chest tightness and shortness of breath.    Cardiovascular: Positive for chest pain (Intermittent, currently chest pain free). Negative for palpitations and leg swelling.   Gastrointestinal: Negative for abdominal pain and blood in stool.   Genitourinary: Negative for hematuria.   Musculoskeletal: Negative for gait problem.   Neurological: Negative for dizziness and syncope.   All other systems reviewed and are negative.      Vital signs in last 24 hours  Temp:  [36.2 °C (97.2 °F)-36.9 °C (98.4 °F)] 36.2 °C (97.2 °F)  Pulse:  [60-89] 62  Resp:  [15-18] 16  BP: (127-155)/() 138/88    Physical Exam  Physical Exam   Constitutional: He is oriented to person, place, and time. He appears well-developed and well-nourished.   HENT:   Head: Normocephalic.   Eyes: EOM are normal.   Neck: No JVD present.   Cardiovascular: Normal rate, regular rhythm, normal heart sounds and intact distal pulses.    Pulmonary/Chest: Effort normal and breath sounds normal. No respiratory distress.   Abdominal: Soft. There is no tenderness.   Musculoskeletal: Normal range of motion. He exhibits no edema.   Neurological: He is alert and oriented to person, place, and time.   Skin: Skin is warm and dry.   Psychiatric: He has a normal mood and affect. His behavior is  normal. Thought content normal.   Nursing note and vitals reviewed.      Lab Review  Lab Results   Component Value Date/Time    WBC 9.5 12/11/2018 05:17 PM    RBC 4.87 12/11/2018 05:17 PM    HEMOGLOBIN 15.9 12/11/2018 05:17 PM    HEMATOCRIT 46.1 12/11/2018 05:17 PM    MCV 94.7 12/11/2018 05:17 PM    MCH 32.6 12/11/2018 05:17 PM    MCHC 34.5 12/11/2018 05:17 PM    MPV 9.6 12/11/2018 05:17 PM      Lab Results   Component Value Date/Time    SODIUM 137 12/11/2018 05:17 PM    POTASSIUM 3.9 12/11/2018 05:17 PM    CHLORIDE 104 12/11/2018 05:17 PM    CO2 24 12/11/2018 05:17 PM    GLUCOSE 116 (H) 12/11/2018 05:17 PM    BUN 15 12/11/2018 05:17 PM    CREATININE 1.08 12/11/2018 05:17 PM      Lab Results   Component Value Date/Time    ASTSGOT 41 12/11/2018 05:17 PM    ALTSGPT 44 12/11/2018 05:17 PM     Lab Results   Component Value Date/Time    CHOLSTRLTOT 296 (H) 12/12/2018 12:28 AM     (H) 12/12/2018 12:28 AM    HDL 62 12/12/2018 12:28 AM    TRIGLYCERIDE 119 12/12/2018 12:28 AM    TROPONINI 4.96 (H) 12/12/2018 06:08 AM       Recent Labs      12/11/18   1430  12/11/18   1717   BNPBTYPENAT  54  48     Echocardiogram: Pending    Imaging  Chest X-Ray 12/11/18:  FINDINGS:  There is no evidence of focal infiltrate or pulmonary edema.  The heart is normal in size.  There is no pleural effusion.  Bony structures and soft tissues are unremarkable.     Assessment/Plan  NSTEMI:  -Trop trending up, last was 4.96.  -Currently chest pain free.  -Plan is for [facility] today.  -Continue hep gtt.   -Continue ASA 81 mg dialy, Lopressor 25 mg BID and statin therapy.  -Echo ordered.      HTN:  -New dx for patient.  -BP is stable.  -Continue BB as above.     HLD:  -Lipid panel this am showing  with .  -Strong suspicion for familial hyperlipemia.   -D/C Atorvastatin 80 mg daily.   -Start Crestor 40 mg daily and Zetia 10 mg daily.     Acid Reflux:  -Continue Omeprazole 20 mg daily.     Thank you for allowing us to participate in the  care of this patient. We will continue to follow alongside you in the care of this patient. Please let us know if you have any questions or concerns.     KYM Beal.   [facility]  [phone]

## 2018-12-12 NOTE — ED NOTES
Med rec updated and complete per [place] med rec  Allergies reviewed  Pt reports that he has not been able to  his LISINOPRIL 10MG or his SUCRALFATE 1GM.  Pt reports no antibiotics in the last 30 days.  Pt reports no prescription medications or vitamins.                              Detailed Report

## 2018-12-12 NOTE — ED NOTES
Report given to Shira BORJAS bedside. Heparin verified bedside. Pt taken to Tele on [facility]. Chart and belongings with patient. No acute signs distress present.

## 2018-12-12 NOTE — PROGRESS NOTES
Received report from Manolo in ED. Heparin gtt running at 1200 units per/hr. Dr. Oden ok with last APTT of 1.12, and re-timing APTT 6 from from continuous start time. Next APTT recheck at 0100.. Pt. Is A&Ox4, patient orientated to the room. Patient denies any pain. Patient's bed is in the lowest position with call light within reach. Pt educated to call appropriately.

## 2018-12-12 NOTE — ED NOTES
Tele RN notified by this RN patient is ready for transfer. Tele RN confirms the room assignment is into a room with a patient of the opposite sex. Tele RN contacting charge RN to find a different room assignment.

## 2018-12-12 NOTE — CONSULTS
Reason for Consult:  Asked by Dr Azevedo to see this patient with  Chest pain  Patient's PCP: Pcp Pt States None    CC:   Chief Complaint   Patient presents with   • MI (Non ST Segment Elevation MI)       HPI: 44-year-old male patient with no prior medical history presented with chest pain.  Initially he started having chest pain Sunday night sent, it woke him up from sleep, felt like pressure.  It relieved after a few minutes, no radiation, not associated with shortness of breath.  Today he had more pain, similar pressure-like sensation.  Drinks 4-5 cocktails every night.  He was seen in urgent care and found to have high blood-pressure.    Medications / Drug list prior to admission:  No current facility-administered medications on file prior to encounter.      Current Outpatient Prescriptions on File Prior to Encounter   Medication Sig Dispense Refill   • omeprazole (PRILOSEC) 20 MG delayed-release capsule Take 20 mg by mouth every day.     • ibuprofen (MOTRIN) 200 MG Tab Take 400 mg by mouth every 6 hours as needed for Mild Pain.         Current list of administered Medications:    Current Facility-Administered Medications:   •  heparin injection 3,200 Units, 3,200 Units, Intravenous, PRN **AND** heparin infusion 25,000 units in 500 ml 0.45% nacl, , Intravenous, Continuous, Last Rate: 24 mL/hr at 12/11/18 1856, 25,000 Units at 12/11/18 1856 **AND** Protocol 440 Heparin Weight Based DO NOT GIVE ANY HEPARIN BOLUS TO STROKE PATIENT, , , CONTINUOUS **AND** Protocol 440 Heparin Weight Based Discontinue Enoxaparin (Lovenox), Dabigatran (Pradaxa), Rivaroxaban (Xarelto), Apixaban (Eliquis), Edoxaban (Savaysa, Lixiana), Fondaparinux (Arixtra) and Argatroban prior to heparin administration, , , Once **AND** Protocol 440 Heparin Weight Based Draw baseline aPTT, PT, and platelet count if not already done, , , CONTINUOUS **AND** Protocol 440 Heparin Weight Based Draw aPTT 6 hours after beginning infusion. , , , CONTINUOUS  **AND** Protocol 440 Heparin Weight Based Record Patient Data, , , CONTINUOUS **AND** Protocol 440 Heparin Weight Based INSTRUCTIONS, , , CONTINUOUS **AND** Protocol 440 Heparin Weight Based Review aPTT results 6 hours after infusion is begun as detailed, , , CONTINUOUS **AND** Protocol 440 Heparin Weight Based Draw Platelet count every three days. Contact MD if platelet is 50% lower than baseline count., , , CONTINUOUS **AND** Protocol 440 Heparin Weight Based Adjust heparin to maintain aPTT between 55-96 sec, , , CONTINUOUS **AND** Protocol 440 Heparin Weight Based Order aPTT 6 hours after any rate change or hold until aPTT is therapeutic (55-96 seconds), , , CONTINUOUS **AND** Protocol 440 Heparin Weight Based Documentation and verification, , , CONTINUOUS, Tenzin Azevedo D.O.    Current Outpatient Prescriptions:   •  omeprazole (PRILOSEC) 20 MG delayed-release capsule, Take 20 mg by mouth every day., Disp: , Rfl:   •  ibuprofen (MOTRIN) 200 MG Tab, Take 400 mg by mouth every 6 hours as needed for Mild Pain., Disp: , Rfl:     Past Medical History:   Diagnosis Date   • Acid reflux    • Kidney stone        No family history of coronary artery disease.    Social History     Social History   • Marital status:      Spouse name: N/A   • Number of children: N/A   • Years of education: N/A     Occupational History   • Not on file.     Social History Main Topics   • Smoking status: Never Smoker   • Smokeless tobacco: Never Used   • Alcohol use 4.2 oz/week     7 Shots of liquor per week      Comment: occasional 2 drinks   • Drug use: No   • Sexual activity: Not on file     Other Topics Concern   • Not on file     Social History Narrative   • No narrative on file       ALLERGIES:  No Known Allergies    Review of systems:  A detailed review of symptoms was reviewed with patient. This is reviewed in H&P and PMH. ALL OTHERS reviewed and negative    Physical exam:  Patient Vitals for the past 24 hrs:   Pulse SpO2  "Height Weight   18 1900 72 99 % - -   18 1700 80 96 % - -   18 1655 74 99 % - -   18 1644 - - 1.702 m (5' 7\") 101.6 kg (224 lb)   18 1643 73 93 % - -       General: No acute distress.   EYES: no jaundice  HEENT: OP clear   Neck: No bruits No JVD.   CVS:   RRR. S1 + S2. No M/R/G  Resp: CTAB. No wheezing or crackles/rhonchi.  Abdomen: Soft, NT, ND,  Skin: Grossly nothing acute no obvious rashes  Neurological: Alert, Moves all extremities, no cranial nerve defects on limited exam  Extremities: Pulse 2+ in b/l LE.  no edema. No cyanosis.       Data:  Laboratory studies:  Recent Results (from the past 24 hour(s))   EKG    Collection Time: 18  2:02 PM   Result Value Ref Range    Report       [facility] Emergency Dept.    Test Date:  2018  Pt Name:    SANCHO DIAZ                   Department: [facility]  MRN:        [id number]                      Room:  Gender:     Male                         Technician: PAVEL  :        1975                   Requested By:ER TRIAGE PROTOCOL  Order #:    [id number]                    Reading MD: OCTAVIO LEUNG. [facility]    Measurements  Intervals                                Axis  Rate:       74                           P:          50  SC:         136                          QRS:        64  QRSD:       96                           T:          32  QT:         392  QTc:        435    Interpretive Statements  SINUS RHYTHM  BASELINE WANDER IN LEAD(S) V2  No previous ECG available for comparison    Electronically Signed On 2018 15:13:08 PST by OCTAVIO LEUNG. ZULEMA     Troponin    Collection Time: 18  2:30 PM   Result Value Ref Range    Troponin I 1.10 (H) 0.00 - 0.04 ng/mL   Btype Natriuretic Peptide    Collection Time: 18  2:30 PM   Result Value Ref Range    B Natriuretic Peptide 54 0 - 100 pg/mL   CBC with Differential    Collection Time: 18  2:30 PM   Result Value Ref Range    WBC 8.4 4.8 - 10.8 K/uL    " RBC 5.28 4.70 - 6.10 M/uL    Hemoglobin 17.0 14.0 - 18.0 g/dL    Hematocrit 48.4 42.0 - 52.0 %    MCV 91.7 81.4 - 97.8 fL    MCH 32.2 27.0 - 33.0 pg    MCHC 35.1 33.7 - 35.3 g/dL    RDW 39.9 35.9 - 50.0 fL    Platelet Count 210 164 - 446 K/uL    MPV 9.3 9.0 - 12.9 fL    Neutrophils-Polys 56.50 44.00 - 72.00 %    Lymphocytes 35.50 22.00 - 41.00 %    Monocytes 7.20 0.00 - 13.40 %    Eosinophils 0.20 0.00 - 6.90 %    Basophils 0.40 0.00 - 1.80 %    Immature Granulocytes 0.20 0.00 - 0.90 %    Nucleated RBC 0.00 /100 WBC    Neutrophils (Absolute) 4.75 1.82 - 7.42 K/uL    Lymphs (Absolute) 2.99 1.00 - 4.80 K/uL    Monos (Absolute) 0.61 0.00 - 0.85 K/uL    Eos (Absolute) 0.02 0.00 - 0.51 K/uL    Baso (Absolute) 0.03 0.00 - 0.12 K/uL    Immature Granulocytes (abs) 0.02 0.00 - 0.11 K/uL    NRBC (Absolute) 0.00 K/uL   Complete Metabolic Panel (CMP)    Collection Time: 12/11/18  2:30 PM   Result Value Ref Range    Sodium 134 (L) 135 - 145 mmol/L    Potassium 3.9 3.6 - 5.5 mmol/L    Chloride 101 96 - 112 mmol/L    Co2 25 20 - 33 mmol/L    Anion Gap 8.0 0.0 - 11.9    Glucose 109 (H) 65 - 99 mg/dL    Bun 16 8 - 22 mg/dL    Creatinine 1.14 0.50 - 1.40 mg/dL    Calcium 9.2 8.4 - 10.2 mg/dL    AST(SGOT) 50 (H) 12 - 45 U/L    ALT(SGPT) 53 (H) 2 - 50 U/L    Alkaline Phosphatase 93 30 - 99 U/L    Total Bilirubin 0.8 0.1 - 1.5 mg/dL    Albumin 4.9 3.2 - 4.9 g/dL    Total Protein 9.1 (H) 6.0 - 8.2 g/dL    Globulin 4.2 (H) 1.9 - 3.5 g/dL    A-G Ratio 1.2 g/dL   Prothrombin Time    Collection Time: 12/11/18  2:30 PM   Result Value Ref Range    PT 14.3 12.0 - 14.6 sec    INR 1.12 0.87 - 1.13   APTT    Collection Time: 12/11/18  2:30 PM   Result Value Ref Range    APTT 30.7 24.7 - 36.0 sec   Lipase    Collection Time: 12/11/18  2:30 PM   Result Value Ref Range    Lipase 24 7 - 58 U/L   ESTIMATED GFR    Collection Time: 12/11/18  2:30 PM   Result Value Ref Range    GFR If African American >60 >60 mL/min/1.73 m 2    GFR If Non African American  >60 >60 mL/min/1.73 m 2   EKG    Collection Time: 18  3:03 PM   Result Value Ref Range    Report       [facility] Emergency Dept.    Test Date:  2018  Pt Name:    SANCHO DIAZ                   Department: [facility]  MRN:        [id number]                      [facility]  Gender:     Male                         Technician: KRIS  :        1975                   Requested By:OCTAVIO LEUNG  Order #:    [id number]                    Reading MD: OCTAVIO LEUNG. AMD    Measurements  Intervals                                Axis  Rate:       75                           P:          58  OH:         151                          QRS:        66  QRSD:       103                          T:          42  QT:         400  QTc:        447    Interpretive Statements  Sinus rhythm  Baseline wander in lead(s) V4  Compared to ECG 2018 14:02:53  No significant changes    Electronically Signed On 2018 16:45:41 PST by OCTAVIO LEUNG. AMD     EKG (NOW)    Collection Time: 18  4:42 PM   Result Value Ref Range    Report       [facility] Emergency Dept.    Test Date:  2018  Pt Name:    SANCHO DIAZ                   Department: ER  MRN:        [id number]                      Room:        13  Gender:     Male                         Technician: [id number]  :        1975                   Requested By:LOYD GALLEGOS  Order #:    [id number]                    Reading MD:    Measurements  Intervals                                Axis  Rate:       77                           P:          55  OH:         152                          QRS:        69  QRSD:       100                          T:          37  QT:         388  QTc:        440    Interpretive Statements  SINUS RHYTHM  Compared to ECG 2018 15:03:07  No significant changes     CBC w/ Differential    Collection Time: 18  5:17 PM   Result Value Ref Range    WBC 9.5 4.8 - 10.8  K/uL    RBC 4.87 4.70 - 6.10 M/uL    Hemoglobin 15.9 14.0 - 18.0 g/dL    Hematocrit 46.1 42.0 - 52.0 %    MCV 94.7 81.4 - 97.8 fL    MCH 32.6 27.0 - 33.0 pg    MCHC 34.5 33.7 - 35.3 g/dL    RDW 41.5 35.9 - 50.0 fL    Platelet Count 210 164 - 446 K/uL    MPV 9.6 9.0 - 12.9 fL    Neutrophils-Polys 64.30 44.00 - 72.00 %    Lymphocytes 28.70 22.00 - 41.00 %    Monocytes 6.30 0.00 - 13.40 %    Eosinophils 0.20 0.00 - 6.90 %    Basophils 0.30 0.00 - 1.80 %    Immature Granulocytes 0.20 0.00 - 0.90 %    Nucleated RBC 0.00 /100 WBC    Neutrophils (Absolute) 6.09 1.82 - 7.42 K/uL    Lymphs (Absolute) 2.72 1.00 - 4.80 K/uL    Monos (Absolute) 0.60 0.00 - 0.85 K/uL    Eos (Absolute) 0.02 0.00 - 0.51 K/uL    Baso (Absolute) 0.03 0.00 - 0.12 K/uL    Immature Granulocytes (abs) 0.02 0.00 - 0.11 K/uL    NRBC (Absolute) 0.00 K/uL   Complete Metabolic Panel (CMP)    Collection Time: 12/11/18  5:17 PM   Result Value Ref Range    Sodium 137 135 - 145 mmol/L    Potassium 3.9 3.6 - 5.5 mmol/L    Chloride 104 96 - 112 mmol/L    Co2 24 20 - 33 mmol/L    Anion Gap 9.0 0.0 - 11.9    Glucose 116 (H) 65 - 99 mg/dL    Bun 15 8 - 22 mg/dL    Creatinine 1.08 0.50 - 1.40 mg/dL    Calcium 9.7 8.5 - 10.5 mg/dL    AST(SGOT) 41 12 - 45 U/L    ALT(SGPT) 44 2 - 50 U/L    Alkaline Phosphatase 87 30 - 99 U/L    Total Bilirubin 0.8 0.1 - 1.5 mg/dL    Albumin 4.5 3.2 - 4.9 g/dL    Total Protein 8.2 6.0 - 8.2 g/dL    Globulin 3.7 (H) 1.9 - 3.5 g/dL    A-G Ratio 1.2 g/dL   Btype Natriuretic Peptide    Collection Time: 12/11/18  5:17 PM   Result Value Ref Range    B Natriuretic Peptide 48 0 - 100 pg/mL   Troponin STAT    Collection Time: 12/11/18  5:17 PM   Result Value Ref Range    Troponin I 2.88 (H) 0.00 - 0.04 ng/mL   ESTIMATED GFR    Collection Time: 12/11/18  5:17 PM   Result Value Ref Range    GFR If African American >60 >60 mL/min/1.73 m 2    GFR If Non African American >60 >60 mL/min/1.73 m 2       Imaging:  No orders to display           EKG :  personally reviewed by me sinus rhythm    All pertinent features of laboratory and imaging reviewed including primary images where applicable    Assessment / Plan:  1.  Non-STEMI  2.  Hypertension  3.  Alcohol abuse    History suggestive of coronary syndrome, along with positive troponin.   Medical therapy with aspirin, Lipitor, heparin IV, metoprolol.  N.p.o. after midnight for cath tomorrow.  Echocardiogram in a.m.      It is my pleasure to participate in the care of Mr. Hatch.  Please do not hesitate to contact me with questions or concerns.    Nahum Bar    12/11/2018

## 2018-12-12 NOTE — ED NOTES
Bolus heparin administered at [facility] at 1600. Continuous heparin gtt started at [facility] at 1900. Dr. Rob chaparro with collecting Q6h APTT 6 hours after the start of continuous heparin gtt. Order placed for recollect at 1am

## 2018-12-12 NOTE — ED PROVIDER NOTES
"ED Provider Note    CHIEF COMPLAINT  Chief Complaint   Patient presents with   • MI (Non ST Segment Elevation MI)       HPI  Jose Juan Hatch is a 43 y.o. male here for evaluation of chest pain.  The patient states that his pain he awoke yesterday with chest pain, and was seen at the urgent care.  He had a EKG that did not show any acute findings and was then sent home.  He woke up again today with substernal chest pain, as \"though something was punching him in the chest.\"  He did not have any associated shortness of breath or nausea.  He had no vomiting.  He states that the chest pain was nonradiating, and was not alleviated or exacerbated by any certain factor.  He has no abdominal pain, and no leg pain.  He has no back pain.  Patient has no history of the same.  He states that he has been having issues with high blood pressure, and was supposed to be taking antihypertensives, but has not done so yet.  He states that he has been told to take them in the past, but only filled his first prescription yesterday when given the prescription by urgent care.    PAST MEDICAL HISTORY   has a past medical history of Acid reflux and Kidney stone.    SOCIAL HISTORY  Social History     Social History Main Topics   • Smoking status: Never Smoker   • Smokeless tobacco: Never Used   • Alcohol use 4.2 oz/week     7 Shots of liquor per week      Comment: occasional 2 drinks   • Drug use: No   • Sexual activity: Not on file       Family History  No bleeding disorders    SURGICAL HISTORY  patient denies any surgical history    CURRENT MEDICATIONS  Home Medications     Reviewed by Shanta Rossi (Pharmacy Tech) on 12/11/18 at 1711  Med List Status: Complete   Medication Last Dose Status   ibuprofen (MOTRIN) 200 MG Tab 12/9/2018 Active   omeprazole (PRILOSEC) 20 MG delayed-release capsule 12/11/2018 Active                ALLERGIES  No Known Allergies    REVIEW OF SYSTEMS  See HPI for further details. Review of systems as above, " otherwise all other systems are negative.     PHYSICAL EXAM  Constitutional: Well developed, well nourished. No acute distress.  HEENT: Normocephalic, atraumatic. Posterior pharynx clear and moist.  Eyes:  EOMI. Normal sclera.  Neck: Supple, Full range of motion, nontender.  Chest/Pulmonary: clear to ausculation. Symmetrical expansion.   Cardio: Regular rate and rhythm with no murmur.   Abdomen: Soft, nontender. No peritoneal signs. No guarding. No palpable masses.  Musculoskeletal: No deformity, no edema, neurovascular intact.   Neuro: Clear speech, appropriate, cooperative, cranial nerves II-XII grossly intact.  Psych: Normal mood and affect    PROCEDURES     MEDICAL RECORD  I have reviewed patient's medical record and pertinent results are listed above.    COURSE & MEDICAL DECISION MAKING  I have reviewed any medical record information, laboratory studies and radiographic results as noted above.    Results for orders placed or performed during the hospital encounter of 12/11/18   CBC w/ Differential   Result Value Ref Range    WBC 9.5 4.8 - 10.8 K/uL    RBC 4.87 4.70 - 6.10 M/uL    Hemoglobin 15.9 14.0 - 18.0 g/dL    Hematocrit 46.1 42.0 - 52.0 %    MCV 94.7 81.4 - 97.8 fL    MCH 32.6 27.0 - 33.0 pg    MCHC 34.5 33.7 - 35.3 g/dL    RDW 41.5 35.9 - 50.0 fL    Platelet Count 210 164 - 446 K/uL    MPV 9.6 9.0 - 12.9 fL    Neutrophils-Polys 64.30 44.00 - 72.00 %    Lymphocytes 28.70 22.00 - 41.00 %    Monocytes 6.30 0.00 - 13.40 %    Eosinophils 0.20 0.00 - 6.90 %    Basophils 0.30 0.00 - 1.80 %    Immature Granulocytes 0.20 0.00 - 0.90 %    Nucleated RBC 0.00 /100 WBC    Neutrophils (Absolute) 6.09 1.82 - 7.42 K/uL    Lymphs (Absolute) 2.72 1.00 - 4.80 K/uL    Monos (Absolute) 0.60 0.00 - 0.85 K/uL    Eos (Absolute) 0.02 0.00 - 0.51 K/uL    Baso (Absolute) 0.03 0.00 - 0.12 K/uL    Immature Granulocytes (abs) 0.02 0.00 - 0.11 K/uL    NRBC (Absolute) 0.00 K/uL   Complete Metabolic Panel (CMP)   Result Value Ref Range     Sodium 137 135 - 145 mmol/L    Potassium 3.9 3.6 - 5.5 mmol/L    Chloride 104 96 - 112 mmol/L    Co2 24 20 - 33 mmol/L    Anion Gap 9.0 0.0 - 11.9    Glucose 116 (H) 65 - 99 mg/dL    Bun 15 8 - 22 mg/dL    Creatinine 1.08 0.50 - 1.40 mg/dL    Calcium 9.7 8.5 - 10.5 mg/dL    AST(SGOT) 41 12 - 45 U/L    ALT(SGPT) 44 2 - 50 U/L    Alkaline Phosphatase 87 30 - 99 U/L    Total Bilirubin 0.8 0.1 - 1.5 mg/dL    Albumin 4.5 3.2 - 4.9 g/dL    Total Protein 8.2 6.0 - 8.2 g/dL    Globulin 3.7 (H) 1.9 - 3.5 g/dL    A-G Ratio 1.2 g/dL   Btype Natriuretic Peptide   Result Value Ref Range    B Natriuretic Peptide 48 0 - 100 pg/mL   Troponin STAT   Result Value Ref Range    Troponin I 2.88 (H) 0.00 - 0.04 ng/mL   ESTIMATED GFR   Result Value Ref Range    GFR If African American >60 >60 mL/min/1.73 m 2    GFR If Non African American >60 >60 mL/min/1.73 m 2      No orders to display         Patient arrived with heparin bolus complete.  Heparin drip ordered through pharmacy.    The patient will be admitted to the hospitalist, and cardiology has been consulted.   will see the pt.      6:25 PM  The pt has no current chest pain.      Dr. Rivas will admit the pt in guarded condition     FINAL IMPRESSION  NSTEMI      Electronically signed by: Tenzin Azevedo, 12/11/2018 6:04 PM

## 2018-12-12 NOTE — ASSESSMENT & PLAN NOTE
Patient was started on metoprolol 25 mg twice daily and atorvastatin 80 mg daily  Troponin continues to climb today, peak of 4.9  Lipid panel markedly elevated and given young age familiar component to early CAD likely   Nitro and morphine when necessary for chest pain  Cardiology has been consulted, plan for cardiac catheterization today

## 2018-12-12 NOTE — ED NOTES
Valerie from Lab called with critical result of troponin 1.10 at 1457. Critical lab result read back to Valerie.   Dr. Sandhu notified of critical lab result at 1500.  Critical lab result read back by Dr. Sandhu.

## 2018-12-12 NOTE — CARE PLAN
Problem: Safety  Goal: Will remain free from falls    Intervention: Assess risk factors for falls   12/12/18 0245   OTHER   Fall Risk Risk to Fall - 0 - 1 point   Risk for Injury-Any positive answers results in the pt being at high risk for fall related injury Coagulation: Therapeutic anticoagulation use (NOT prophylaxis)   Mobility Status Assessment 1-1 Healthcare Provider Required for Assistance with Ambulation & Transfer   History of fall 0   Pt Calls for Assistance Yes     Intervention: Implement fall precautions   12/12/18 0245   OTHER   Environmental Precautions Personal Belongings, Wastebasket, Call Bell etc. in Easy Reach;Transferred to Stronger Side;Communication Sign for Patients & Families;Bed in Low Position;Report Given to Other Health Care Providers Regarding Fall Risk;Mobility Assessed & Appropriate Sign Placed   IV Pole on Same Side of Bed as Bathroom Yes   Bedrails Bedrails Closest to Bathroom Down   Bed Alarm Yes - Alarm On

## 2018-12-12 NOTE — PROGRESS NOTES
Dea from Lab called with critical result of Troponin of 4.73 at 0225. Critical lab result read back to Dea.   Dr. Bar notified of critical lab result at 0237.  Critical lab result read back by Dr. Bar. Patient NPO for cath in am. Will continue to trend troponin x1.

## 2018-12-12 NOTE — ED NOTES
Pt medicated with Metoprolol 5mg. BP now 162/101. Pt had a Heparin bolus 6000 units. [facility] here to escort pt to [facility] ER as a non-stemi transfer.

## 2018-12-12 NOTE — PROGRESS NOTES
Assumed care at 0705 from Shira BORJAS with Elsa BORJAS. Received bedside report.  Updated patient on daily plan of care. Patient denies any additional needs at this time.  Patient belongings and call light with in reach.  Vitals stable.

## 2018-12-13 ENCOUNTER — TELEPHONE (OUTPATIENT)
Dept: HEALTH INFORMATION MANAGEMENT | Facility: OTHER | Age: 43
End: 2018-12-13

## 2018-12-13 VITALS
RESPIRATION RATE: 18 BRPM | OXYGEN SATURATION: 94 % | HEIGHT: 67 IN | TEMPERATURE: 97.5 F | BODY MASS INDEX: 34.57 KG/M2 | HEART RATE: 72 BPM | SYSTOLIC BLOOD PRESSURE: 120 MMHG | DIASTOLIC BLOOD PRESSURE: 72 MMHG | WEIGHT: 220.24 LBS

## 2018-12-13 LAB
ANION GAP SERPL CALC-SCNC: 8 MMOL/L (ref 0–11.9)
BUN SERPL-MCNC: 16 MG/DL (ref 8–22)
CALCIUM SERPL-MCNC: 9.1 MG/DL (ref 8.5–10.5)
CHLORIDE SERPL-SCNC: 103 MMOL/L (ref 96–112)
CO2 SERPL-SCNC: 24 MMOL/L (ref 20–33)
CREAT SERPL-MCNC: 1.03 MG/DL (ref 0.5–1.4)
EKG IMPRESSION: NORMAL
ERYTHROCYTE [DISTWIDTH] IN BLOOD BY AUTOMATED COUNT: 41.7 FL (ref 35.9–50)
GLUCOSE SERPL-MCNC: 100 MG/DL (ref 65–99)
HCT VFR BLD AUTO: 45.4 % (ref 42–52)
HGB BLD-MCNC: 15.2 G/DL (ref 14–18)
MCH RBC QN AUTO: 32.1 PG (ref 27–33)
MCHC RBC AUTO-ENTMCNC: 33.5 G/DL (ref 33.7–35.3)
MCV RBC AUTO: 95.8 FL (ref 81.4–97.8)
PLATELET # BLD AUTO: 185 K/UL (ref 164–446)
PMV BLD AUTO: 9.8 FL (ref 9–12.9)
POTASSIUM SERPL-SCNC: 3.7 MMOL/L (ref 3.6–5.5)
RBC # BLD AUTO: 4.74 M/UL (ref 4.7–6.1)
SODIUM SERPL-SCNC: 135 MMOL/L (ref 135–145)
WBC # BLD AUTO: 10.1 K/UL (ref 4.8–10.8)

## 2018-12-13 PROCEDURE — 700102 HCHG RX REV CODE 250 W/ 637 OVERRIDE(OP): Performed by: INTERNAL MEDICINE

## 2018-12-13 PROCEDURE — 99239 HOSP IP/OBS DSCHRG MGMT >30: CPT | Performed by: HOSPITALIST

## 2018-12-13 PROCEDURE — A9270 NON-COVERED ITEM OR SERVICE: HCPCS | Performed by: INTERNAL MEDICINE

## 2018-12-13 PROCEDURE — 93010 ELECTROCARDIOGRAM REPORT: CPT | Performed by: INTERNAL MEDICINE

## 2018-12-13 PROCEDURE — A9270 NON-COVERED ITEM OR SERVICE: HCPCS | Performed by: NURSE PRACTITIONER

## 2018-12-13 PROCEDURE — 700105 HCHG RX REV CODE 258: Performed by: INTERNAL MEDICINE

## 2018-12-13 PROCEDURE — 93005 ELECTROCARDIOGRAM TRACING: CPT | Performed by: INTERNAL MEDICINE

## 2018-12-13 PROCEDURE — 700102 HCHG RX REV CODE 250 W/ 637 OVERRIDE(OP): Performed by: NURSE PRACTITIONER

## 2018-12-13 PROCEDURE — 99232 SBSQ HOSP IP/OBS MODERATE 35: CPT | Performed by: INTERNAL MEDICINE

## 2018-12-13 RX ORDER — ASPIRIN 81 MG/1
81 TABLET ORAL DAILY
Qty: 30 TAB | Refills: 0 | Status: SHIPPED | OUTPATIENT
Start: 2018-12-14 | End: 2019-01-11 | Stop reason: SDUPTHER

## 2018-12-13 RX ORDER — ROSUVASTATIN CALCIUM 40 MG/1
40 TABLET, COATED ORAL EVERY EVENING
Qty: 30 TAB | Refills: 11 | Status: SHIPPED | OUTPATIENT
Start: 2018-12-13 | End: 2018-12-31 | Stop reason: SDUPTHER

## 2018-12-13 RX ORDER — CLOPIDOGREL BISULFATE 75 MG/1
75 TABLET ORAL DAILY
Qty: 30 TAB | Refills: 0 | Status: SHIPPED | OUTPATIENT
Start: 2018-12-14 | End: 2018-12-31 | Stop reason: SDUPTHER

## 2018-12-13 RX ORDER — EZETIMIBE 10 MG/1
10 TABLET ORAL DAILY
Qty: 30 TAB | Refills: 0 | Status: SHIPPED | OUTPATIENT
Start: 2018-12-14 | End: 2018-12-31 | Stop reason: SDUPTHER

## 2018-12-13 RX ADMIN — OMEPRAZOLE 20 MG: 20 CAPSULE, DELAYED RELEASE ORAL at 05:18

## 2018-12-13 RX ADMIN — SODIUM CHLORIDE: 9 INJECTION, SOLUTION INTRAVENOUS at 06:39

## 2018-12-13 RX ADMIN — CLOPIDOGREL 75 MG: 75 TABLET, FILM COATED ORAL at 05:18

## 2018-12-13 RX ADMIN — METOPROLOL TARTRATE 25 MG: 25 TABLET, FILM COATED ORAL at 05:17

## 2018-12-13 RX ADMIN — EZETIMIBE 10 MG: 10 TABLET ORAL at 05:18

## 2018-12-13 RX ADMIN — ASPIRIN 81 MG: 81 TABLET, COATED ORAL at 05:17

## 2018-12-13 ASSESSMENT — ENCOUNTER SYMPTOMS
DIZZINESS: 0
BLOOD IN STOOL: 0
PALPITATIONS: 0
FEVER: 0
ABDOMINAL PAIN: 0
SHORTNESS OF BREATH: 0
CHEST TIGHTNESS: 0

## 2018-12-13 ASSESSMENT — PAIN SCALES - GENERAL
PAINLEVEL_OUTOF10: 0

## 2018-12-13 NOTE — PROGRESS NOTES
Assumed care of patient.  Patient resting in bed with call light in reach.  Patient denies chest pain.

## 2018-12-13 NOTE — PROGRESS NOTES
Discharge instructions given and explained, scripts called into local pharmacy for . Ambulates self tolerates well. No c/o pain at this time. IV's dc'd. R wrist site intact covered w/ dry sterile dressing R radial pulse positive. Skin intact. Lungs clear. Sinus rhythm on telemetry.

## 2018-12-13 NOTE — PROGRESS NOTES
Cardiovascular Nurse Navigator ([phone]) Note:    NSTEMI with PCI 12/12/18. EF is 65% per echocardiogram on 12/12/18. No heart failure diagnosis.    Reviewed ACS medications:  · DAPT: aspirin + clopidogrel  · Beta-Blocker:  lopressor  · Statin:  rosuvastatin  · Consider for aldosterone blockade?  no -- EF is 65%  · Consider for ACE-I?  no -- EF is 65%    Meds to Beds:  I have messaged Dr. Harrison requesting clopidogrel order. Awaiting response.  Please call [phone] (or [phone] and request 'on-call anti-coag pharmacist' if after hours) if patient has not received medication at time of discharge.    Intensive Cardiac Rehab (ICR) Referral:  Referred on 12/12/18; has current inpatient orders for nutrition consult & PT for Phase I ICR    Demographics  Patient resides in: [place]  Insurance: [employer]    Inpatient & Discharge Patient Education:  Bedside nursing to continually provide patient education on ACS meds, signs and symptoms to monitor for, and risk factor modification.     Also at discharge please complete the “Acute Coronary Syndrome” special instructions on the AVS.          Follow up care    [facility]  Call on 1/11/2019  Your physician has referred you to Intensive Cardiac Rehab. You cannot begin ICR for 6 weeks to allow time for your heart to heal. If you do not hear from ICR in about 5 weeks, please call them to schedule. Thank you!  [address]  [address]  [facility] [id number] [phone]         Spoke with hospital , Socorro requested that she arrange outpatient cardiology appointment.    Thank you and please call with questions.

## 2018-12-13 NOTE — CARE PLAN
Problem: Safety  Goal: Will remain free from falls  Outcome: PROGRESSING AS EXPECTED  Patient resting in bed with call light in reach and bed alarm on.      Problem: Respiratory:  Goal: Respiratory status will improve  Outcome: PROGRESSING AS EXPECTED  Patient on room air and oxygen saturation within normal limits.

## 2018-12-13 NOTE — DISCHARGE SUMMARY
Discharge Summary    CHIEF COMPLAINT ON ADMISSION  Chief Complaint   Patient presents with   • MI (Non ST Segment Elevation MI)       Reason for Admission  TRANSFER     Admission Date  12/11/2018    CODE STATUS  Full Code    HPI & HOSPITAL COURSE    He initially presented to [facility].  He reports substernal chest pain which is squeezing in nature rated at 8/10 intensity associated with diaphoresis.  He denied any relieving or exacerbating factors.  His troponin was elevated at 1.1 > 2.8.  Cardiology was consulted.  The patient was given aspirin and started on IV heparin and transferred to [facility] for cardiac catheterization. EKG on arrival showed sinus rhythm with ST depressions in inferior leads. Chest x-ray interpreted by me reveals no acute cardiopulmonary process. Patient noted to have elevated lipid panel.      Cath showed:   First diagonal vessel along the medial branch proximal 99%, culprit vessel; long thin caliber ramus 95%, distal circumflex 50%. EF normal with anterior akinesis.     Had successful PCI proximal medial branch of first diagonal vessel 2.75 x 15 mm Zions GAL.    Will need to follow up for genetic testing. He tolerated PCI well, vitals stable, without chest pain afterwards. Cleared by Cardiology for discharge.     Therefore, he is discharged in good and stable condition to home with close outpatient follow-up.    The patient met 2-midnight criteria for an inpatient stay at the time of discharge.    Discharge Date  12/13/2018    FOLLOW UP ITEMS POST DISCHARGE  As per Cards, continue medications, continue DAPT, get genetic testing    DISCHARGE DIAGNOSES  Principal Problem:    NSTEMI (non-ST elevated myocardial infarction) (HCC) POA: Yes  Active Problems:    Coronary artery disease involving native coronary artery of native heart with unstable angina pectoris (HCC) POA: Unknown    Hypertension POA: Yes    HLD (hyperlipidemia) POA: Yes    Obesity POA: Yes  Resolved  Problems:    * No resolved hospital problems. *      FOLLOW UP  Future Appointments  Date Time Provider Department Center   12/17/2018 9:40 AM JACKELYN CARE MANAGER [address]   12/26/2018 8:20 AM Jeannette Bruno M.D. [address]   12/31/2018 7:40 AM CHEYENNE Archuleta [facility] None   4/18/2019 9:15 AM Kolby Godwin M.D. [facility]  [address]  [address]  [facility] [address]  [phone]  Call on 1/11/2019  Your physician has referred you to Intensive Cardiac Rehab. You cannot begin ICR for 6 weeks to allow time for your heart to heal. If you do not hear from ICR in about 5 weeks, please call them to schedule. Thank you!      MEDICATIONS ON DISCHARGE     Medication List      START taking these medications      Instructions   aspirin 81 MG EC tablet  Start taking on:  12/14/2018   Take 1 Tab by mouth every day.  Dose:  81 mg     clopidogrel 75 MG Tabs  Start taking on:  12/14/2018  Commonly known as:  PLAVIX   Take 1 Tab by mouth every day.  Dose:  75 mg     ezetimibe 10 MG Tabs  Start taking on:  12/14/2018  Commonly known as:  ZETIA   Take 1 Tab by mouth every day.  Dose:  10 mg     metoprolol 25 MG Tabs  Commonly known as:  LOPRESSOR   Take 1 Tab by mouth 2 Times a Day.  Dose:  25 mg     rosuvastatin 40 MG tablet  Commonly known as:  CRESTOR   Take 1 Tab by mouth every evening.  Dose:  40 mg        CONTINUE taking these medications      Instructions   ibuprofen 200 MG Tabs  Commonly known as:  MOTRIN   Take 400 mg by mouth every 6 hours as needed for Mild Pain.  Dose:  400 mg     omeprazole 20 MG delayed-release capsule  Commonly known as:  PRILOSEC   Take 20 mg by mouth every day.  Dose:  20 mg            Allergies  No Known Allergies    DIET  Orders Placed This Encounter   Procedures   • Diet Order Cardiac     Standing Status:   Standing     Number of Occurrences:   1     Order Specific Question:   Diet:     Answer:   Cardiac [6]        ACTIVITY  As tolerated.  Weight bearing as tolerated    CONSULTATIONS  Cardiology    PROCEDURES  PCI    LABORATORY  Lab Results   Component Value Date    SODIUM 135 12/12/2018    POTASSIUM 3.7 12/12/2018    CHLORIDE 103 12/12/2018    CO2 24 12/12/2018    GLUCOSE 100 (H) 12/12/2018    BUN 16 12/12/2018    CREATININE 1.03 12/12/2018        Lab Results   Component Value Date    WBC 10.1 12/12/2018    HEMOGLOBIN 15.2 12/12/2018    HEMATOCRIT 45.4 12/12/2018    PLATELETCT 185 12/12/2018        Total time of the discharge process exceeds 45 minutes.

## 2018-12-13 NOTE — DISCHARGE INSTRUCTIONS
Discharge Instructions    Discharged to home by car with relative. Discharged via wheelchair, hospital escort: Yes.  Special equipment needed: Not Applicable    Be sure to schedule a follow-up appointment with your primary care doctor or any specialists as instructed.     Discharge Plan:   Diet Plan: Discussed  Activity Level: Discussed  Confirmed Follow up Appointment: Appointment Scheduled  Confirmed Symptoms Management: Discussed  Medication Reconciliation Updated: Yes  Influenza Vaccine Indication: Patient Refuses    I understand that a diet low in cholesterol, fat, and sodium is recommended for good health. Unless I have been given specific instructions below for another diet, I accept this instruction as my diet prescription.   Other diet: Cardiac diet    Special Instructions: Diagnosis:  Acute Coronary Syndrome (ACS) is a diagnosis that encompasses cardiac-related chest pain and heart attack. ACS occurs when the blood flow to the heart muscle is severely reduced or cut off completely due to a slow process called atherosclerosis.  Atherosclerosis is a disease in which the coronary arteries become narrow from a buildup of fat, cholesterol, and other substances that combine to form plaque. If the plaque breaks, a blood clot will form and block the blood flow to the heart muscle. This lack of blood flow can cause damage or death to the heart muscle which is called a heart attack or Myocardial Infarction (MI). There are two different types of MIs:  ST Elevation Myocardial Infarction or STEMI (the most severe type of heart attack) and Non-ST Elevation Myocardial Infarction or NSTEMI.    Treatment Plan:  · Cardiac Diet  - Low fat, low salt, low cholesterol   · Cardiac Rehab  - Your doctor has ordered you a referral to [facility].  Call [phone] to schedule an appointment.  · Attend my follow-up appointment with my Cardiologist.  · Take my medications as prescribed by my doctor  · Exercise daily  · Quit Smoking,  Lower my bad cholesterol and raise my good cholesterol, lower my blood pressure and Reduce stress    Medications:  Certain medications are used to treat ACS.  Remember to always take medications as prescribed and never stop talking medications unless told by your doctor.    You have been prescribed the following medicatons:    Aspirin - Aspirin is used as a blood thinning medication and you will require this medication indefinitely.  Anti-platelet/blood thinner - Your Anti-platelet/Blood thinning medication is called Plavix (clopidogrel), and is used in combination with aspirin to prevent clots from forming in your heart and/or around your stent.  Your doctor will determine how long you need to be on this medicine.  Beta-Blocker - Beta-Blocker Metoprolol is used to lower blood pressure and heart rate, and/or helps your heart heal after a heart attack.  Statin - Statin rosuvastatin is used to lower cholesterol.    · Is patient discharged on Warfarin / Coumadin?   No     Depression / Suicide Risk    As you are discharged from this [facility] facility, it is important to learn how to keep safe from harming yourself.    Recognize the warning signs:  · Abrupt changes in personality, positive or negative- including increase in energy   · Giving away possessions  · Change in eating patterns- significant weight changes-  positive or negative  · Change in sleeping patterns- unable to sleep or sleeping all the time   · Unwillingness or inability to communicate  · Depression  · Unusual sadness, discouragement and loneliness  · Talk of wanting to die  · Neglect of personal appearance   · Rebelliousness- reckless behavior  · Withdrawal from people/activities they love  · Confusion- inability to concentrate     If you or a loved one observes any of these behaviors or has concerns about self-harm, here's what you can do:  · Talk about it- your feelings and reasons for harming yourself  · Remove any means that you might use to  hurt yourself (examples: pills, rope, extension cords, firearm)  · Get professional help from the community (Mental Health, Substance Abuse, psychological counseling)  · Do not be alone:Call your Safe Contact- someone whom you trust who will be there for you.  · Call your local CRISIS HOTLINE 628-4727 or 780-511-4451  · Call your local Children's Mobile Crisis Response Team [place] (105) 246-0824 or www.[employer]  · Call the toll free National Suicide Prevention Hotlines   · National Suicide Prevention Lifeline 711-530-WTHH (3437)  · [facility] Line Network 800-SUICIDE (353-6542)        Angiogram  An angiogram, also called angiography, is a procedure used to look at the blood vessels. In this procedure, dye is injected through a long, thin tube (catheter) into an artery. X-rays are then taken. The X-rays will show if there is a blockage or problem in a blood vessel.  Tell a health care provider about:  · Any allergies you have, including allergies to shellfish or contrast dye.  · All medicines you are taking, including vitamins, herbs, eye drops, creams, and over-the-counter medicines.  · Any problems you or family members have had with anesthetic medicines.  · Any blood disorders you have.  · Any surgeries you have had.  · Any previous kidney problems or failure you have had.  · Any medical conditions you have.  · Possibility of pregnancy, if this applies.  What are the risks?  Generally, an angiogram is a safe procedure. However, as with any procedure, problems can occur. Possible problems include:  · Injury to the blood vessels, including rupture or bleeding.  · Infection or bruising at the catheter site.  · Allergic reaction to the dye or contrast used.  · Kidney damage from the dye or contrast used.  · Blood clots that can lead to a stroke or heart attack.  What happens before the procedure?  · Do not eat or drink after midnight on the night before the procedure, or as directed by your health  care provider.  · Ask your health care provider if you may drink enough water to take any needed medicines the morning of the procedure.  What happens during the procedure?  · You may be given a medicine to help you relax (sedative) before and during the procedure. This medicine is given through an IV access tube that is inserted into one of your veins.  · The area where the catheter will be inserted will be washed and shaved. This is usually done in the groin but may be done in the fold of your arm (near your elbow) or in the wrist.  · A medicine will be given to numb the area where the catheter will be inserted (local anesthetic).  · The catheter will be inserted with a guide wire into an artery. The catheter is guided by using a type of X-ray (fluoroscopy) to the blood vessel being examined.  · Dye is then injected into the catheter, and X-rays are taken. The dye helps to show where any narrowing or blockages are located.  What happens after the procedure?  · If the procedure is done through the leg, you will be kept in bed lying flat for several hours. You will be instructed to not bend or cross your legs.  · The insertion site will be checked frequently.  · The pulse in your feet or wrist will be checked frequently.  · Additional blood tests, X-rays, and electrocardiography may be done.  · You may need to stay in the hospital overnight for observation.  This information is not intended to replace advice given to you by your health care provider. Make sure you discuss any questions you have with your health care provider.  Document Released: 09/27/2006 Document Revised: 05/31/2017 Document Reviewed: 05/21/2014  Elsevier Interactive Patient Education © 2017 Elsevier Inc.    Heart Attack  A heart attack (myocardial infarction, MI) causes damage to your heart that cannot be fixed. A heart attack can happen when a heart (coronary) artery becomes blocked or narrowed. This cuts off the blood supply and oxygen to your  heart.  When one or more of your coronary arteries becomes blocked, that area of your heart begins to die. This causes the pain you feel during a heart attack. Heart attack pain can also occur in one part of the body but be felt in another part of the body (referred pain). You may feel referred heart attack pain in your left arm, neck, or jaw. Pain may even be felt in the right arm.  What are the causes?  Many conditions can cause a heart attack. These include:  · Atherosclerosis. This is when a fatty substance (plaque) gradually builds up in the arteries. This buildup can block or reduce the blood supply to one or more of the heart arteries.  · A blood clot. A blood clot can develop suddenly when plaque breaks up (ruptures) within a heart artery. A blood clot can block the heart artery, which prevents blood flow to the heart.  · Severe tightening (spasm) of the heart artery. This cuts off blood flow through the artery.  What increases the risk?     People at risk for heart attack usually have one or more of the following risk factors:  · High blood pressure (hypertension).  · High cholesterol.  · Smoking.  · Being male.  · Being overweight or obese.  · Older aged.  · A family history of heart disease.  · Lack of exercise.  · Diabetes.  · Stress.  · Drinking too much alcohol.  · Using illegal street drugs, such as cocaine and methamphetamines.  What are the signs or symptoms?  Heart attack symptoms can vary from person to person. Symptoms depend on factors like gender and age.  · In both men and women, heart attack symptoms can include the following:  ¨ Chest pain. This may feel like crushing, squeezing, or a feeling of pressure.  ¨ Shortness of breath.  ¨ Heartburn or indigestion with or without vomiting, shortness of breath, or sweating.  ¨ Sudden cold sweats.  ¨ Sudden light-headedness.  ¨ Upper back pain.  · Women can have unique heart attack symptoms, such as:  ¨ Unexplained feelings of nervousness or  anxiety.  ¨ Discomfort between the shoulder blades or upper back.  ¨ Tingling in the hands and arms.  · Older people (of both genders) can have subtle heart attack symptoms, such as:  ¨ Sweating.  ¨ Shortness of breath.  ¨ General tiredness or not feeling well.  How is this diagnosed?  Diagnosing a heart attack involves several tests. They include:  · An assessment of your vital signs. This includes checking your:  ¨ Heart rhythm.  ¨ Blood pressure.  ¨ Breathing rate.  ¨ Oxygen level.  · An ECG (electrocardiogram) to measure the electrical activity of your heart.  · Blood tests called cardiac markers. In these tests, blood is drawn at scheduled times to check for the specific proteins or enzymes released by damaged heart muscle.  · A chest X-ray.  · An echocardiogram to evaluate heart motion and blood flow.  · Coronary angiography to look at the heart arteries.  How is this treated?  Treatment for a heart attack may include:  · Medicine that breaks up or dissolves blood clots in the heart artery.  · Angioplasty.  · Cardiac stent placement.  · Intra-aortic balloon pump therapy (IABP).  · Open heart surgery (coronary artery bypass graft, CABG).  Follow these instructions at home:  · Take medicines only as directed by your health care provider. You may need to take medicine after a heart attack to:  ¨ Keep your blood from clotting too easily.  ¨ Control your blood pressure.  ¨ Lower your cholesterol.  ¨ Control abnormal heart rhythms.  · Do not take the following medicines unless your health care provider approves:  ¨ Nonsteroidal anti-inflammatory drugs (NSAIDs), such as ibuprofen, naproxen, or celecoxib.  ¨ Vitamin supplements that contain vitamin A, vitamin E, or both.  ¨ Hormone replacement therapy that contains estrogen with or without progestin.  · Make lifestyle changes as directed by your health care provider. These may include:  ¨ Quitting smoking, if you smoke.  ¨ Getting regular exercise. Ask your health  care provider to suggest some activities that are safe for you.  ¨ Eating a heart-healthy diet. A registered dietitian can help you learn healthy eating options.  ¨ Maintaining a healthy weight.  ¨ Managing diabetes, if necessary.  ¨ Reducing stress.  ¨ Limiting how much alcohol you drink.  Get help right away if:  · You have sudden, unexplained chest discomfort.  · You have sudden, unexplained discomfort in your arms, back, neck, or jaw.  · You have shortness of breath at any time.  · You suddenly start to sweat or your skin gets clammy.  · You feel nauseous or vomit.  · You suddenly feel light-headed or dizzy.  · Your heart begins to beat fast or feels like it is skipping beats.  These symptoms may represent a serious problem that is an emergency. Do not wait to see if the symptoms will go away. Get medical help right away. Call your local emergency services (911 in the [place] Do not drive yourself to the hospital.   This information is not intended to replace advice given to you by your health care provider. Make sure you discuss any questions you have with your health care provider.  Document Released: 12/18/2006 Document Revised: 05/25/2017 Document Reviewed: 02/20/2015  [employer] Interactive Patient Education © 2017 Elsevier Inc.        Aspirin, ASA oral tablets  What is this medicine?  ASPIRIN (AS pir in) is a pain reliever. It is used to treat mild pain and fever. This medicine is also used as directed by a doctor to prevent and to treat heart attacks, to prevent strokes, and to treat arthritis or inflammation.  This medicine may be used for other purposes; ask your health care provider or pharmacist if you have questions.  COMMON BRAND NAME(S): Aspir-Low, Aspir-Sierra, Aspirtab, Mckenzie Advanced Aspirin, Mckenzie Aspirin, Mckenzie Aspirin Extra Strength, Mckenzie Aspirin Plus, Mckenzie Extra Strength, Mckenzie Extra Strength Plus, Mckenzie Genuine Aspirin, Mckenzie Womens Aspirin, Bufferin, Bufferin Extra Strength, Bufferin Low  Dose  What should I tell my health care provider before I take this medicine?  They need to know if you have any of these conditions:  -anemia  -asthma  -bleeding problems  -child with chickenpox, the flu, or other viral infection  -diabetes  -gout  -if you frequently drink alcohol containing drinks  -kidney disease  -liver disease  -low level of vitamin K  -lupus  -smoke tobacco  -stomach ulcers or other problems  -an unusual or allergic reaction to aspirin, tartrazine dye, other medicines, dyes, or preservatives  -pregnant or trying to get pregnant  -breast-feeding  How should I use this medicine?  Take this medicine by mouth with a glass of water. Follow the directions on the package or prescription label. You can take this medicine with or without food. If it upsets your stomach, take it with food. Do not take your medicine more often than directed.  Talk to your pediatrician regarding the use of this medicine in children. While this drug may be prescribed for children as young as 12 years of age for selected conditions, precautions do apply. Children and teenagers should not use this medicine to treat chicken pox or flu symptoms unless directed by a doctor.  Patients over 65 years old may have a stronger reaction and need a smaller dose.  Overdosage: If you think you have taken too much of this medicine contact a poison control center or emergency room at once.  NOTE: This medicine is only for you. Do not share this medicine with others.  What if I miss a dose?  If you are taking this medicine on a regular schedule and miss a dose, take it as soon as you can. If it is almost time for your next dose, take only that dose. Do not take double or extra doses.  What may interact with this medicine?  Do not take this medicine with any of the following medications:  -cidofovir  -ketorolac  -probenecid  This medicine may also interact with the following medications:  -alcohol  -alendronate  -bismuth  subsalicylate  -flavocoxid  -herbal supplements like feverfew, garlic, bhupendra, ginkgo biloba, horse chestnut  -medicines for diabetes or glaucoma like acetazolamide, methazolamide  -medicines for gout  -medicines that treat or prevent blood clots like enoxaparin, heparin, ticlopidine, warfarin  -other aspirin and aspirin-like medicines  -NSAIDs, medicines for pain and inflammation, like ibuprofen or naproxen  -pemetrexed  -sulfinpyrazone  -varicella live vaccine  This list may not describe all possible interactions. Give your health care provider a list of all the medicines, herbs, non-prescription drugs, or dietary supplements you use. Also tell them if you smoke, drink alcohol, or use illegal drugs. Some items may interact with your medicine.  What should I watch for while using this medicine?  If you are treating yourself for pain, tell your doctor or health care professional if the pain lasts more than 10 days, if it gets worse, or if there is a new or different kind of pain. Tell your doctor if you see redness or swelling. Also, check with your doctor if you have a fever that lasts for more than 3 days. Only take this medicine to prevent heart attacks or blood clotting if prescribed by your doctor or health care professional.  Do not take aspirin or aspirin-like medicines with this medicine. Too much aspirin can be dangerous. Always read the labels carefully.  This medicine can irritate your stomach or cause bleeding problems. Do not smoke cigarettes or drink alcohol while taking this medicine. Do not lie down for 30 minutes after taking this medicine to prevent irritation to your throat.  If you are scheduled for any medical or dental procedure, tell your healthcare provider that you are taking this medicine. You may need to stop taking this medicine before the procedure.  This medicine may be used to treat migraines. If you take migraine medicines for 10 or more days a month, your migraines may get worse.  Keep a diary of headache days and medicine use. Contact your healthcare professional if your migraine attacks occur more frequently.  What side effects may I notice from receiving this medicine?  Side effects that you should report to your doctor or health care professional as soon as possible:  -allergic reactions like skin rash, itching or hives, swelling of the face, lips, or tongue  -breathing problems  -changes in hearing, ringing in the ears  -confusion  -general ill feeling or flu-like symptoms  -pain on swallowing  -redness, blistering, peeling or loosening of the skin, including inside the mouth or nose  -signs and symptoms of bleeding such as bloody or black, tarry stools; red or dark-brown urine; spitting up blood or brown material that looks like coffee grounds; red spots on the skin; unusual bruising or bleeding from the eye, gums, or nose  -trouble passing urine or change in the amount of urine  -unusually weak or tired  -yellowing of the eyes or skin  Side effects that usually do not require medical attention (report to your doctor or health care professional if they continue or are bothersome):  -diarrhea or constipation  -headache  -nausea, vomiting  -stomach gas, heartburn  This list may not describe all possible side effects. Call your doctor for medical advice about side effects. You may report side effects to FDA at 5-054-FDA-7718.  Where should I keep my medicine?  Keep out of the reach of children.  Store at room temperature between 15 and 30 degrees C (59 and 86 degrees F). Protect from heat and moisture. Do not use this medicine if it has a strong vinegar smell. Throw away any unused medicine after the expiration date.  NOTE: This sheet is a summary. It may not cover all possible information. If you have questions about this medicine, talk to your doctor, pharmacist, or health care provider.  © 2018 Elsevier/Gold Standard (2014-08-19 11:30:31)  Clopidogrel tablets  What is this  medicine?  CLOPIDOGREL (kloh PID oh grel) helps to prevent blood clots. This medicine is used to prevent heart attack, stroke, or other vascular events in people who are at high risk.  This medicine may be used for other purposes; ask your health care provider or pharmacist if you have questions.  COMMON BRAND NAME(S): Plavix  What should I tell my health care provider before I take this medicine?  They need to know if you have any of the following conditions:  -bleeding disorders  -bleeding in the brain  -having surgery  -history of stomach bleeding  -an unusual or allergic reaction to clopidogrel, other medicines, foods, dyes, or preservatives  -pregnant or trying to get pregnant  -breast-feeding  How should I use this medicine?  Take this medicine by mouth with a glass of water. Follow the directions on the prescription label. You may take this medicine with or without food. If it upsets your stomach, take it with food. Take your medicine at regular intervals. Do not take it more often than directed. Do not stop taking except on your doctor's advice.  A special MedGuide will be given to you by the pharmacist with each prescription and refill. Be sure to read this information carefully each time.  Talk to your pediatrician regarding the use of this medicine in children. Special care may be needed.  Overdosage: If you think you have taken too much of this medicine contact a poison control center or emergency room at once.  NOTE: This medicine is only for you. Do not share this medicine with others.  What if I miss a dose?  If you miss a dose, take it as soon as you can. If it is almost time for your next dose, take only that dose. Do not take double or extra doses.  What may interact with this medicine?  Do not take this medicine with the following medications:  -dasabuvir; ombitasvir; paritaprevir; ritonavir  -defibrotide  This medicine may also interact with the following medications:  -antiviral medicines for  HIV or AIDS  -aspirin  -certain medicines for depression like citalopram, fluoxetine, fluvoxamine  -certain medicines for fungal infections like ketoconazole, fluconazole, voriconazole  -certain medicines for seizures like felbamate, oxcarbazepine, phenytoin  -certain medicines for stomach problems like cimetidine, omeprazole, esomeprazole  -certain medicines that treat or prevent blood clots like warfarin, enoxaparin, dalteparin, apixaban, dabigatran, rivaroxaban, ticlopidine  -chloramphenicol  -cilostazol  -fluvastatin  -isoniazid  -modafinil  -nicardipine  -NSAIDS, medicines for pain and inflammation, like ibuprofen or naproxen  -quinine  -repaglinide  -tamoxifen  -tolbutamide  -topiramate  -torsemide  This list may not describe all possible interactions. Give your health care provider a list of all the medicines, herbs, non-prescription drugs, or dietary supplements you use. Also tell them if you smoke, drink alcohol, or use illegal drugs. Some items may interact with your medicine.  What should I watch for while using this medicine?  Visit your doctor or health care professional for regular check ups. Do not stop taking your medicine unless your doctor tells you to.  Notify your doctor or health care professional and seek emergency treatment if you develop breathing problems; changes in vision; chest pain; severe, sudden headache; pain, swelling, warmth in the leg; trouble speaking; sudden numbness or weakness of the face, arm or leg. These can be signs that your condition has gotten worse.  If you are going to have surgery or dental work, tell your doctor or health care professional that you are taking this medicine.  Certain genetic factors may reduce the effect of this medicine. Your doctor may use genetic tests to determine treatment.  What side effects may I notice from receiving this medicine?  Side effects that you should report to your doctor or health care professional as soon as possible:  -allergic  reactions like skin rash, itching or hives, swelling of the face, lips, or tongue  -signs and symptoms of bleeding such as bloody or black, tarry stools; red or dark-brown urine; spitting up blood or brown material that looks like coffee grounds; red spots on the skin; unusual bruising or bleeding from the eye, gums, or nose  -signs and symptoms of a blood clot such as breathing problems; changes in vision; chest pain; severe, sudden headache; pain, swelling, warmth in the leg; trouble speaking; sudden numbness or weakness of the face, arm or leg  Side effects that usually do not require medical attention (report to your doctor or health care professional if they continue or are bothersome):  -constipation  -diarrhea  -headache  -upset stomach  This list may not describe all possible side effects. Call your doctor for medical advice about side effects. You may report side effects to FDA at 4-346-FDA-5741.  Where should I keep my medicine?  Keep out of the reach of children.  Store at room temperature of 59 to 86 degrees F (15 to 30 degrees C). Throw away any unused medicine after the expiration date.  NOTE: This sheet is a summary. It may not cover all possible information. If you have questions about this medicine, talk to your doctor, pharmacist, or health care provider.  © 2018 Elsevier/Gold Standard (2016-09-22 10:00:44)    Ezetimibe Tablets  What is this medicine?  EZETIMIBE (ez ET i mibe) blocks the absorption of cholesterol from the stomach. It can help lower blood cholesterol for patients who are at risk of getting heart disease or a stroke. It is only for patients whose cholesterol level is not controlled by diet.  This medicine may be used for other purposes; ask your health care provider or pharmacist if you have questions.  COMMON BRAND NAME(S): Zetia  What should I tell my health care provider before I take this medicine?  They need to know if you have any of these conditions:  -liver disease  -an  unusual or allergic reaction to ezetimibe, medicines, foods, dyes, or preservatives  -pregnant or trying to get pregnant  -breast-feeding  How should I use this medicine?  Take this medicine by mouth with a glass of water. Follow the directions on the prescription label. This medicine can be taken with or without food. Take your doses at regular intervals. Do not take your medicine more often than directed.  Talk to your pediatrician regarding the use of this medicine in children. Special care may be needed.  Overdosage: If you think you have taken too much of this medicine contact a poison control center or emergency room at once.  NOTE: This medicine is only for you. Do not share this medicine with others.  What if I miss a dose?  If you miss a dose, take it as soon as you can. If it is almost time for your next dose, take only that dose. Do not take double or extra doses.  What may interact with this medicine?  Do not take this medicine with any of the following medications:  -fenofibrate  -gemfibrozil  This medicine may also interact with the following medications:  -antacids  -cyclosporine  -herbal medicines like red yeast rice  -other medicines to lower cholesterol or triglycerides  This list may not describe all possible interactions. Give your health care provider a list of all the medicines, herbs, non-prescription drugs, or dietary supplements you use. Also tell them if you smoke, drink alcohol, or use illegal drugs. Some items may interact with your medicine.  What should I watch for while using this medicine?  Visit your doctor or health care professional for regular checks on your progress. You will need to have your cholesterol levels checked. If you are also taking some other cholesterol medicines, you will also need to have tests to make sure your liver is working properly.  Tell your doctor or health care professional if you get any unexplained muscle pain, tenderness, or weakness, especially if you  also have a fever and tiredness.  You need to follow a low-cholesterol, low-fat diet while you are taking this medicine. This will decrease your risk of getting heart and blood vessel disease. Exercising and avoiding alcohol and smoking can also help. Ask your doctor or dietician for advice.  What side effects may I notice from receiving this medicine?  Side effects that you should report to your doctor or health care professional as soon as possible:  -allergic reactions like skin rash, itching or hives, swelling of the face, lips, or tongue  -dark yellow or brown urine  -unusually weak or tired  -yellowing of the skin or eyes  Side effects that usually do not require medical attention (report to your doctor or health care professional if they continue or are bothersome):  -diarrhea  -dizziness  -headache  -stomach upset or pain  This list may not describe all possible side effects. Call your doctor for medical advice about side effects. You may report side effects to FDA at 6-272-FDA-1900.  Where should I keep my medicine?  Keep out of the reach of children.  Store at room temperature between 15 and 30 degrees C (59 and 86 degrees F). Protect from moisture. Keep container tightly closed. Throw away any unused medicine after the expiration date.  NOTE: This sheet is a summary. It may not cover all possible information. If you have questions about this medicine, talk to your doctor, pharmacist, or health care provider.  © 2018 Elsevier/Gold Standard (2013-06-24 15:39:09)    Metoprolol tablets  What is this medicine?  METOPROLOL (me TOE proe lole) is a beta-blocker. Beta-blockers reduce the workload on the heart and help it to beat more regularly. This medicine is used to treat high blood pressure and to prevent chest pain. It is also used to after a heart attack and to prevent an additional heart attack from occurring.  This medicine may be used for other purposes; ask your health care provider or pharmacist if you  have questions.  COMMON BRAND NAME(S): Lopressor  What should I tell my health care provider before I take this medicine?  They need to know if you have any of these conditions:  -diabetes  -heart or vessel disease like slow heart rate, worsening heart failure, heart block, sick sinus syndrome or Raynaud's disease  -kidney disease  -liver disease  -lung or breathing disease, like asthma or emphysema  -pheochromocytoma  -thyroid disease  -an unusual or allergic reaction to metoprolol, other beta-blockers, medicines, foods, dyes, or preservatives  -pregnant or trying to get pregnant  -breast-feeding  How should I use this medicine?  Take this medicine by mouth with a drink of water. Follow the directions on the prescription label. Take this medicine immediately after meals. Take your doses at regular intervals. Do not take more medicine than directed. Do not stop taking this medicine suddenly. This could lead to serious heart-related effects.  Talk to your pediatrician regarding the use of this medicine in children. Special care may be needed.  Overdosage: If you think you have taken too much of this medicine contact a poison control center or emergency room at once.  NOTE: This medicine is only for you. Do not share this medicine with others.  What if I miss a dose?  If you miss a dose, take it as soon as you can. If it is almost time for your next dose, take only that dose. Do not take double or extra doses.  What may interact with this medicine?  This medicine may interact with the following medications:  -certain medicines for blood pressure, heart disease, irregular heart beat  -certain medicines for depression like monoamine oxidase (MAO) inhibitors, fluoxetine, or paroxetine  -clonidine  -dobutamine  -epinephrine  -isoproterenol  -reserpine  This list may not describe all possible interactions. Give your health care provider a list of all the medicines, herbs, non-prescription drugs, or dietary supplements you  use. Also tell them if you smoke, drink alcohol, or use illegal drugs. Some items may interact with your medicine.  What should I watch for while using this medicine?  Visit your doctor or health care professional for regular check ups. Contact your doctor right away if your symptoms worsen. Check your blood pressure and pulse rate regularly. Ask your health care professional what your blood pressure and pulse rate should be, and when you should contact them.  You may get drowsy or dizzy. Do not drive, use machinery, or do anything that needs mental alertness until you know how this medicine affects you. Do not sit or stand up quickly, especially if you are an older patient. This reduces the risk of dizzy or fainting spells. Contact your doctor if these symptoms continue. Alcohol may interfere with the effect of this medicine. Avoid alcoholic drinks.  What side effects may I notice from receiving this medicine?  Side effects that you should report to your doctor or health care professional as soon as possible:  -allergic reactions like skin rash, itching or hives  -cold or numb hands or feet  -depression  -difficulty breathing  -faint  -fever with sore throat  -irregular heartbeat, chest pain  -rapid weight gain  -swollen legs or ankles  Side effects that usually do not require medical attention (report to your doctor or health care professional if they continue or are bothersome):  -anxiety or nervousness  -change in sex drive or performance  -dry skin  -headache  -nightmares or trouble sleeping  -short term memory loss  -stomach upset or diarrhea  -unusually tired  This list may not describe all possible side effects. Call your doctor for medical advice about side effects. You may report side effects to FDA at 0-775-FDA-6347.  Where should I keep my medicine?  Keep out of the reach of children.  Store at room temperature between 15 and 30 degrees C (59 and 86 degrees F). Throw away any unused medicine after the  expiration date.  NOTE: This sheet is a summary. It may not cover all possible information. If you have questions about this medicine, talk to your doctor, pharmacist, or health care provider.  © 2018 [employer]vier/Gold Standard (2014-08-22 14:40:36)    Rosuvastatin Tablets  What is this medicine?  ROSUVASTATIN (karishma GENNY va sta tin) is known as a HMG-CoA reductase inhibitor or 'statin'. It lowers cholesterol and triglycerides in the blood. This drug may also reduce the risk of heart attack, stroke, or other health problems in patients with risk factors for heart disease. Diet and lifestyle changes are often used with this drug.  This medicine may be used for other purposes; ask your health care provider or pharmacist if you have questions.  COMMON BRAND NAME(S): Crestor  What should I tell my health care provider before I take this medicine?  They need to know if you have any of these conditions:  -frequently drink alcoholic beverages  -kidney disease  -liver disease  -muscle aches or weakness  -other medical condition  -an unusual or allergic reaction to rosuvastatin, other medicines, foods, dyes, or preservatives  -pregnant or trying to get pregnant  -breast-feeding  How should I use this medicine?  Take this medicine by mouth with a glass of water. Follow the directions on the prescription label. Do not cut, crush or chew this medicine. You can take this medicine with or without food. Take your doses at regular intervals. Do not take your medicine more often than directed.  Talk to your pediatrician regarding the use of this medicine in children. While this drug may be prescribed for children as young as 7 years old for selected conditions, precautions do apply.  Overdosage: If you think you have taken too much of this medicine contact a poison control center or emergency room at once.  NOTE: This medicine is only for you. Do not share this medicine with others.  What if I miss a dose?  If you miss a dose, take it as  soon as you can. Do not take 2 doses within 12 hours of each other. If there are less than 12 hours until your next dose, take only that dose. Do not take double or extra doses.  What may interact with this medicine?  Do not take this medicine with any of the following medications:  -herbal medicines like red yeast rice  This medicine may also interact with the following medications:  -alcohol  -antacids containing aluminum hydroxide or magnesium hydroxide  -cyclosporine  -other medicines for high cholesterol  -some medicines for HIV infection  -warfarin  This list may not describe all possible interactions. Give your health care provider a list of all the medicines, herbs, non-prescription drugs, or dietary supplements you use. Also tell them if you smoke, drink alcohol, or use illegal drugs. Some items may interact with your medicine.  What should I watch for while using this medicine?  Visit your doctor or health care professional for regular check-ups. You may need regular tests to make sure your liver is working properly.  Tell your doctor or health care professional right away if you get any unexplained muscle pain, tenderness, or weakness, especially if you also have a fever and tiredness. Your doctor or health care professional may tell you to stop taking this medicine if you develop muscle problems. If your muscle problems do not go away after stopping this medicine, contact your health care professional.  This medicine may affect blood sugar levels. If you have diabetes, check with your doctor or health care professional before you change your diet or the dose of your diabetic medicine.  Avoid taking antacids containing aluminum, calcium or magnesium within 2 hours of taking this medicine.  This drug is only part of a total heart-health program. Your doctor or a dietician can suggest a low-cholesterol and low-fat diet to help. Avoid alcohol and smoking, and keep a proper exercise schedule.  Do not use this  drug if you are pregnant or breast-feeding. Serious side effects to an unborn child or to an infant are possible. Talk to your doctor or pharmacist for more information.  What side effects may I notice from receiving this medicine?  Side effects that you should report to your doctor or health care professional as soon as possible:  -allergic reactions like skin rash, itching or hives, swelling of the face, lips, or tongue  -dark urine  -fever  -joint pain  -muscle cramps, pain  -redness, blistering, peeling or loosening of the skin, including inside the mouth  -trouble passing urine or change in the amount of urine  -unusually weak or tired  -yellowing of the eyes or skin  Side effects that usually do not require medical attention (report to your doctor or health care professional if they continue or are bothersome):  -constipation  -heartburn  -nausea  -stomach gas, pain, upset  This list may not describe all possible side effects. Call your doctor for medical advice about side effects. You may report side effects to FDA at 8-079-FDA-6082.  Where should I keep my medicine?  Keep out of the reach of children.  Store at room temperature between 20 and 25 degrees C (68 and 77 degrees F). Keep container tightly closed (protect from moisture). Throw away any unused medicine after the expiration date.  NOTE: This sheet is a summary. It may not cover all possible information. If you have questions about this medicine, talk to your doctor, pharmacist, or health care provider.  © 2018 [employer]vier/Gold Standard (2016-06-02 13:33:08)

## 2018-12-13 NOTE — CATH LAB
Immediate Post-Operative Note      PreOp Diagnosis:   NSTEMI    PostOp Diagnosis:  CAD: First diagonal vessel along the medial branch proximal 99%, culprit vessel; long thin caliber ramus 95%, distal circumflex 50%.  EF normal with anterior akinesis.      Procedure(s) :  Coronary Angiography, Left Heart Catheterization, Left Ventriculography  PCI proximal medial branch of first diagonal vessel 2.75 x 15 mm Zions GAL.    Surgeon(s):  Mj Valles M.D.    Type of Anesthesia: Moderate Sedation    Specimen: None    Complications: None    Plan  OMT for CAD post MI post PCI        Mj Valles M.D.  12/12/2018 4:42 PM

## 2018-12-13 NOTE — PROGRESS NOTES
Cardiology Follow Up Progress Note    Date of Service  12/13/2018    Attending Physician  Kenneth Baptiste M.D.       Chief Complaint   Chest pain      Consulted for NSTEMI.     NAIMA Hatch is a 43 y.o. male admitted 12/11/2018 with substernal chest pain X 2 days and elevated troponin.      Past medical history significant for hypertension, ETOH use, acid reflux and nephrolithiasis.      Interim Events  12/12/18:  Resting in bed without any complaints. Anxious to have his angiogram completed. No significant events overnight per nursing.     12/13/18: Feeling well. Anxious to go home. S/P cath with PCI yesterday, recovered without any complications. No significant events overnight or arrhyhtmia on tele.    Monitor: SR 64-74 with rare PVC    Review of Systems  Review of Systems   Constitutional: Negative for fever.   Respiratory: Negative for chest tightness and shortness of breath.    Cardiovascular: Negative for chest pain, palpitations and leg swelling.   Gastrointestinal: Negative for abdominal pain and blood in stool.   Genitourinary: Negative for hematuria.   Musculoskeletal: Negative for gait problem.   Neurological: Negative for dizziness and syncope.   All other systems reviewed and are negative.      Vital signs in last 24 hours  Temp:  [36.3 °C (97.4 °F)-36.7 °C (98 °F)] 36.4 °C (97.5 °F)  Pulse:  [62-75] 72  Resp:  [16-20] 18  BP: (114-151)/(58-96) 120/72    Physical Exam  Physical Exam   Constitutional: He is oriented to person, place, and time. He appears well-developed and well-nourished.   HENT:   Head: Normocephalic.   Eyes: EOM are normal.   Neck: No JVD present.   Cardiovascular: Normal rate, regular rhythm, normal heart sounds and intact distal pulses.    Pulmonary/Chest: Effort normal and breath sounds normal. No respiratory distress.   Abdominal: Soft. There is no tenderness.   Musculoskeletal: Normal range of motion. He exhibits no edema.   Neurological: He is alert and oriented to  person, place, and time.   Skin: Skin is warm and dry.   Psychiatric: He has a normal mood and affect. His behavior is normal. Thought content normal.   Nursing note and vitals reviewed.      Lab Review  Lab Results   Component Value Date/Time    WBC 10.1 12/12/2018 11:58 PM    RBC 4.74 12/12/2018 11:58 PM    HEMOGLOBIN 15.2 12/12/2018 11:58 PM    HEMATOCRIT 45.4 12/12/2018 11:58 PM    MCV 95.8 12/12/2018 11:58 PM    MCH 32.1 12/12/2018 11:58 PM    MCHC 33.5 (L) 12/12/2018 11:58 PM    MPV 9.8 12/12/2018 11:58 PM      Lab Results   Component Value Date/Time    SODIUM 135 12/12/2018 11:58 PM    POTASSIUM 3.7 12/12/2018 11:58 PM    CHLORIDE 103 12/12/2018 11:58 PM    CO2 24 12/12/2018 11:58 PM    GLUCOSE 100 (H) 12/12/2018 11:58 PM    BUN 16 12/12/2018 11:58 PM    CREATININE 1.03 12/12/2018 11:58 PM      Lab Results   Component Value Date/Time    ASTSGOT 41 12/11/2018 05:17 PM    ALTSGPT 44 12/11/2018 05:17 PM     Lab Results   Component Value Date/Time    CHOLSTRLTOT 296 (H) 12/12/2018 12:28 AM     (H) 12/12/2018 12:28 AM    HDL 62 12/12/2018 12:28 AM    TRIGLYCERIDE 119 12/12/2018 12:28 AM    TROPONINI 4.96 (H) 12/12/2018 06:08 AM       Recent Labs      12/11/18   1430  12/11/18   1717   BNPBTYPENAT  54  48     Echocardiogram 12/12/18:  CONCLUSIONS  No prior study is available for comparison.   Left ventricular ejection fraction is visually estimated to be 65%.  Normal diastolic function.  Normal right ventricular size and systolic function.  No significant valve disease or flow abnormalities.     Left Ventricle  3 mL of contrast was administered. Contrast was used to enhance visualization of the endocardial border. Mild concentric left ventricular hypertrophy. Left ventricular ejection fraction is visually estimated to be 65%. Normal regional wall motion. Normal diastolic function.    Coronary Angiogram 12/12/18:  PreOp Diagnosis:   NSTEMI     PostOp Diagnosis:  CAD: First diagonal vessel along the medial  branch proximal 99%, culprit vessel; long thin caliber ramus 95%, distal circumflex 50%.  EF normal with anterior akinesis.     Imaging  Chest X-Ray 12/11/18:  FINDINGS:  There is no evidence of focal infiltrate or pulmonary edema.  The heart is normal in size.  There is no pleural effusion.  Bony structures and soft tissues are unremarkable.      Assessment/Plan  NSTEMI:  -Trop peak of 4.96.  -S/P LHC on 12/13/18 with PCI with GAL X 1 to proximal medial branch of first diagonal vessel.    -Echo showing LVEF of 65% and normal regional wall motion.   -DAPT ASA 81 mg daily and Plavix 75 mg daily.   -Continue Lopressor 25 mg BID and statin therapy.     HTN:  -New dx for patient.  -Echo showing mild LVH.   -BP remains stable.   -Continue BB as above.      HLD:  -Lipid panel this am showing  with .  -Strong suspicion for familial hyperlipemia, recommend outpatient referral to [facility] for testing.   -Continue Crestor 40 mg daily and Zetia 10 mg daily.      Acid Reflux:  -Omeprazole has possible interaction with Plavix, can decrease efficacy.  -Consider starting H2 blocker such as Famotidine, Cimetidine etc.      Thank you for allowing us to participate in the care of this patient. Patient is stable from cardiology perspective for discharge with follow up as below. Please let us know if you have any questions or concerns.     Future Appointments  Date Time Provider Department Center   12/31/2018 7:40 AM CHEYENNE Archuleta [facility] None        KYM Beal.   [facility]  [phone]

## 2018-12-17 ENCOUNTER — PATIENT OUTREACH (OUTPATIENT)
Dept: HEALTH INFORMATION MANAGEMENT | Facility: OTHER | Age: 43
End: 2018-12-17

## 2018-12-17 NOTE — PROGRESS NOTES
12/17/18 9:40am:  CM post discharge outreach call first attempt.   left requesting return call to  at [phone].

## 2018-12-26 ENCOUNTER — OFFICE VISIT (OUTPATIENT)
Dept: MEDICAL GROUP | Facility: MEDICAL CENTER | Age: 43
End: 2018-12-26
Payer: COMMERCIAL

## 2018-12-26 VITALS
HEART RATE: 78 BPM | TEMPERATURE: 96.5 F | BODY MASS INDEX: 34.21 KG/M2 | RESPIRATION RATE: 14 BRPM | SYSTOLIC BLOOD PRESSURE: 120 MMHG | WEIGHT: 218 LBS | HEIGHT: 67 IN | OXYGEN SATURATION: 92 % | DIASTOLIC BLOOD PRESSURE: 72 MMHG

## 2018-12-26 DIAGNOSIS — R73.03 PREDIABETES: ICD-10-CM

## 2018-12-26 DIAGNOSIS — Z09 HOSPITAL DISCHARGE FOLLOW-UP: ICD-10-CM

## 2018-12-26 DIAGNOSIS — E66.9 OBESITY (BMI 30.0-34.9): ICD-10-CM

## 2018-12-26 DIAGNOSIS — I25.2 HISTORY OF NON-ST ELEVATION MYOCARDIAL INFARCTION (NSTEMI): ICD-10-CM

## 2018-12-26 DIAGNOSIS — I10 ESSENTIAL HYPERTENSION: ICD-10-CM

## 2018-12-26 DIAGNOSIS — K21.9 GASTROESOPHAGEAL REFLUX DISEASE, ESOPHAGITIS PRESENCE NOT SPECIFIED: ICD-10-CM

## 2018-12-26 DIAGNOSIS — I25.110 CORONARY ARTERY DISEASE INVOLVING NATIVE CORONARY ARTERY OF NATIVE HEART WITH UNSTABLE ANGINA PECTORIS (HCC): ICD-10-CM

## 2018-12-26 PROCEDURE — 99215 OFFICE O/P EST HI 40 MIN: CPT | Performed by: FAMILY MEDICINE

## 2018-12-26 RX ORDER — PANTOPRAZOLE SODIUM 40 MG/1
40 TABLET, DELAYED RELEASE ORAL DAILY
Qty: 30 TAB | Refills: 2 | Status: SHIPPED | OUTPATIENT
Start: 2018-12-26 | End: 2019-02-01 | Stop reason: SDUPTHER

## 2018-12-26 NOTE — PATIENT INSTRUCTIONS
If you need further assistance, or have any questions; concerns or lingering symptoms before seeing your Primary Care Provider or specialist.     Do not hesitate to contact us.     Please contact us at the Post-Hospital Follow Up Program at [phone] and ask for the Medical Assistant for Dr Bruno.   Our offices hours are Monday-Friday 8 am-5 pm.

## 2018-12-26 NOTE — PROGRESS NOTES
Subjective:     Jose Juan Hatch is a 43 y.o. male who presents for Hospital Follow-up.  Chart and discharge summary reviewed.   Date of discharge 12/13/18.  48- hour post discharge RN call completed on 12/17/18 and documented in the medical record by Kendal Rudolph RN:   POST DISCHARGE CALL:  Discharge Date:12/13/2018   Date of Outreach Call: 12/17/2018 12:40 PM  Now that you're home, how are you doing? Fair  Comment:Pt denies any further episodes of chest pain.  States he went back to work. Pt doesn't currently have a  PCP. Pt scheduled at post discharge clinic.  Do you have questions about your medications? Yes  Comment:Pt reports he was informed at hospital that  there may be interaction with omeprazole and Plavix. Pt  states he will check with his pharmacist and will not take  omeprazole until clarification.    Did you fill your medications? Yes    Do you have a follow-up appointment scheduled?Yes    Discharging Department: Telemetry 8    Number of Attempts: 2  Current or previous attempts completed within two business days of discharge? Yes  Provided education regarding treatment plan, medication, self-management, ADLs? Yes  Has patient completed Advance Directive? If yes, advise them to bring to appointment. No  Care Manager phone number provided? Yes  Is there anything else I can help you with? No        HPI: Recently hospitalized for a non-ST elevation MI after presenting with substernal chest pain.  He underwent cardiac catheterization and drug-eluting stent was placed at the proximal first diagonal vessel along the medial branch which showed a 99% blockage.  He was also hypertensive.  He was started on Plavix, aspirin, metoprolol, Zetia, and Crestor.  Lipid panel showed a total cholesterol 296, , HDL 62, triglycerides 119.  Labs also revealed prediabetes with a hemoglobin A1c of 5.9.    Since returning home, patient reports feeling good.     The patient has questions regarding hospitalization or  discharge: Many questions were answered, especially regarding proper diet and healthy lifestyle changes.  The patient denies weakness; denies difficulty taking care of self at home.  Patient reports taking medications as instructed.    Current Outpatient Prescriptions   Medication Sig Dispense Refill   • aspirin EC 81 MG EC tablet Take 1 Tab by mouth every day. 30 Tab 0   • clopidogrel (PLAVIX) 75 MG Tab Take 1 Tab by mouth every day. 30 Tab 0   • ezetimibe (ZETIA) 10 MG Tab Take 1 Tab by mouth every day. 30 Tab 0   • metoprolol (LOPRESSOR) 25 MG Tab Take 1 Tab by mouth 2 Times a Day. 60 Tab 0   • rosuvastatin (CRESTOR) 40 MG tablet Take 1 Tab by mouth every evening. 30 Tab 11   • omeprazole (PRILOSEC) 20 MG delayed-release capsule Take 20 mg by mouth every day.     • ibuprofen (MOTRIN) 200 MG Tab Take 400 mg by mouth every 6 hours as needed for Mild Pain.       No current facility-administered medications for this visit.        Allergies:   Patient has no known allergies.    Social History:  Social History   Substance Use Topics   • Smoking status: Never Smoker   • Smokeless tobacco: Never Used   • Alcohol use 4.2 oz/week     7 Shots of liquor per week      Comment: occasional 2 drinks        Family History:  History reviewed. No pertinent family history.    Past Medical History:   Diagnosis Date   • Acid reflux    • Kidney stone        Surgical History  History reviewed. No pertinent surgical history.    ROS:    Constitutional:  Denies fever, chills, night sweats, fatigue or malaise  HENT: Denies head congestion, ear pain or drainage, decreased hearing, sore throat  Eyes: Denies visual changes, eye drainage or redness, eye pain  Neck: Denies pain, swollen glands, decreased range of motion  Lungs: Denies shortness of breath, wheezing, cough  Cardiovascular: Denies chest pain, orthopnea, lower extremity edema, palpitations  Abdominal: Denies abdominal pain, change in bowel or bladder habits, nausea or  "vomiting  Musculoskeletal: Denies back pain, joint pains, decreased range of motion  Endocrine: Denies unexplained weight changes, heat or cold intolerance, hair loss, polyuria or polydipsia  Neurological: Denies dizziness, headache, confusion, focal weakness or numbness, memory loss  Psychiatric: Denies depression, anxiety, insomnia       Objective:     Blood pressure 120/72, pulse 78, temperature 35.8 °C (96.5 °F), temperature source Temporal, resp. rate 14, height 1.702 m (5' 7\"), weight 98.9 kg (218 lb), SpO2 92 %.     Physical Exam:    GEN:  Alert, oriented, in no distress  HEENT:  PERRLA, EOMI  NECK;  Supple without cervical adenopathy   LUNGS:  Clear to auscultation without rales, rhonci, or wheezes.  CV:  Heart RRR without murmurs or S3 or S4  EXT:  Without cyanosis, clubbing, or edema.  NEURO:  Cranial nerves II through XII intact.  Motor function and sensation intact.  SKIN: No rashes or suspicious lesions.  PSYCH:  Behavior is appropriate.      Assessment and Plan:     1. Hospital follow-up:   Hospitalization and results reviewed with patient. High risk conditions requiring teaching or care coordination were identified and addressed.The patient demonstrate understanding of admission and underlying conditions. The patient understands discharge instructions and when to seek medical attention. Medications reviewed including instructions regarding high risk medications, dosing and side effects.    The patient is able to safely adhere to ADL/IADL, treatment and medication regimen, self-manage of high-risk conditions? Yes  The patient requires physical therapy/home health/DME referral? No   The patient requires referral to care coordination/behavioral health/social work?  No   Patient requires referral for pharmacy consult? No     2. Coronary artery disease involving native coronary artery of native heart with unstable angina pectoris (HCC)  Status post stent placement and medical management as " above.  Extensive counseling given regarding healthy lifestyle habits and healthy diet with emphasis on Mediterranean type diet and more plant-based whole foods.    3. History of non-ST elevation myocardial infarction (NSTEMI)  He has cardiology follow-up next week and I encouraged him to participate in the cardiac rehab program.    4. Essential hypertension  Currently controlled.  Recently started on metoprolol.    5. Prediabetes  Appropriate counseling given regarding healthy lifestyle habits.    6. Obesity (BMI 30.0-34.9)  Appropriate counseling given regarding weight loss measures.        Medication Reconciliation  Medication list at end of encounter:   Current Outpatient Prescriptions   Medication Sig Dispense Refill   • aspirin EC 81 MG EC tablet Take 1 Tab by mouth every day. 30 Tab 0   • clopidogrel (PLAVIX) 75 MG Tab Take 1 Tab by mouth every day. 30 Tab 0   • ezetimibe (ZETIA) 10 MG Tab Take 1 Tab by mouth every day. 30 Tab 0   • metoprolol (LOPRESSOR) 25 MG Tab Take 1 Tab by mouth 2 Times a Day. 60 Tab 0   • rosuvastatin (CRESTOR) 40 MG tablet Take 1 Tab by mouth every evening. 30 Tab 11   • omeprazole (PRILOSEC) 20 MG delayed-release capsule Take 20 mg by mouth every day.     • ibuprofen (MOTRIN) 200 MG Tab Take 400 mg by mouth every 6 hours as needed for Mild Pain.       No current facility-administered medications for this visit.        Primary care follow-up:    Recommended followup:  with new PCP Maxi Lerner on 1/30/19.  Future Appointments       Provider Department Center    12/31/2018 7:40 AM [facility]                  Patient Instruction  Patient provided with educational material on discharge diagnosis and management of symptoms/red flags. Patient instructed to keep follow-up appointments and to bring written questions and and actual medications to each office visit. Patient instructed to call PCP/specialist with any  problems/questions/concerns. Patient verbalizes understanding and has no further questions at this time.    Total of 40 minutes face-to-face time was spent with patient, with greater than 50% of the time spent in counseling regarding needed lifestyle changes and coordination of care and getting him a timely appointment to establish care with a PCP.      Addendum: Patient had been taking omeprazole for GERD which she said was very effective.  There was concern per omeprazole labor leading that he should not take this with Plavix.  He tried Pepcid and ranitidine but these did not help and without omeprazole he has severe heartburn.  In doing a literature review, there was the general conclusion that omeprazole was safe to use with Plavix.  However, the patient called and said that his pharmacist recommended Protonix so we will prescribe this per his preference.

## 2018-12-31 ENCOUNTER — OFFICE VISIT (OUTPATIENT)
Dept: CARDIOLOGY | Facility: MEDICAL CENTER | Age: 43
End: 2018-12-31
Payer: COMMERCIAL

## 2018-12-31 ENCOUNTER — TELEPHONE (OUTPATIENT)
Dept: CARDIOLOGY | Facility: MEDICAL CENTER | Age: 43
End: 2018-12-31

## 2018-12-31 VITALS
OXYGEN SATURATION: 93 % | DIASTOLIC BLOOD PRESSURE: 84 MMHG | HEART RATE: 66 BPM | BODY MASS INDEX: 34.37 KG/M2 | WEIGHT: 219 LBS | HEIGHT: 67 IN | SYSTOLIC BLOOD PRESSURE: 120 MMHG

## 2018-12-31 DIAGNOSIS — E78.2 MIXED HYPERLIPIDEMIA: ICD-10-CM

## 2018-12-31 DIAGNOSIS — Z95.5 STATUS POST INSERTION OF DRUG ELUTING CORONARY ARTERY STENT: ICD-10-CM

## 2018-12-31 DIAGNOSIS — I21.4 NSTEMI (NON-ST ELEVATED MYOCARDIAL INFARCTION) (HCC): ICD-10-CM

## 2018-12-31 DIAGNOSIS — I10 ESSENTIAL HYPERTENSION: ICD-10-CM

## 2018-12-31 DIAGNOSIS — I25.110 CORONARY ARTERY DISEASE INVOLVING NATIVE CORONARY ARTERY OF NATIVE HEART WITH UNSTABLE ANGINA PECTORIS (HCC): Primary | ICD-10-CM

## 2018-12-31 PROCEDURE — 99214 OFFICE O/P EST MOD 30 MIN: CPT | Performed by: NURSE PRACTITIONER

## 2018-12-31 RX ORDER — CLOPIDOGREL BISULFATE 75 MG/1
75 TABLET ORAL DAILY
Qty: 30 TAB | Refills: 11 | Status: SHIPPED | OUTPATIENT
Start: 2018-12-31 | End: 2019-10-02 | Stop reason: SDUPTHER

## 2018-12-31 RX ORDER — ROSUVASTATIN CALCIUM 40 MG/1
40 TABLET, COATED ORAL EVERY EVENING
Qty: 30 TAB | Refills: 11 | Status: SHIPPED | OUTPATIENT
Start: 2018-12-31 | End: 2020-01-08

## 2018-12-31 RX ORDER — EZETIMIBE 10 MG/1
10 TABLET ORAL DAILY
Qty: 30 TAB | Refills: 11 | Status: SHIPPED | OUTPATIENT
Start: 2018-12-31 | End: 2019-12-26

## 2018-12-31 ASSESSMENT — ENCOUNTER SYMPTOMS
ORTHOPNEA: 0
ABDOMINAL PAIN: 0
CLAUDICATION: 0
NERVOUS/ANXIOUS: 1
MYALGIAS: 0
WEAKNESS: 0
PND: 0
COUGH: 0
SHORTNESS OF BREATH: 0
PALPITATIONS: 0
BACK PAIN: 1
DIZZINESS: 0

## 2018-12-31 NOTE — TELEPHONE ENCOUNTER
Return to work letter printed with [employer]'s recommendations.    Attempted to call patient to update patient with letter and to request fax number to fax letter.  Unable to reach.  Left voicemail to return this call with fax number at his earliest convenience.

## 2018-12-31 NOTE — PROGRESS NOTES
Chief Complaint   Patient presents with   • HTN (Controlled)     FV       Subjective:   Jose Juan Hatch is a 43 y.o. male who presents today with his wife, Anjelica, for hospital follow-up.  He had chest pain and presented to the hospital at [facility] and was transferred to [facility] when his EKG and troponins were abnormal.  He was taken to the Cath Lab where he was discovered to have a 99% occlusion of his first diagonal which was successfully stented with a drug-eluting stent.  He did have a 95% occlusion in a small caliber thin ramus intermedius which was not able to be stented.  Per echocardiogram his ejection fraction 65%.    Since being out of the hospital, he is not had any anginal symptoms.  He on rare occasion will have a sharp stabbing pain which worries him.    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  He is anxious to return to work.    Past Medical History:   Diagnosis Date   • Acid reflux    • CAD (coronary artery disease) 12/12/2018    stent to 1st diagonal.   • Kidney stone      Past Surgical History:   Procedure Laterality Date   • CARDIAC CATH  12/12/2018    Rasheed to 1st diagonal.     Family History   Problem Relation Age of Onset   • Other Father         prostate CA     Social History     Social History   • Marital status:      Spouse name: N/A   • Number of children: N/A   • Years of education: N/A     Occupational History   • Not on file.     Social History Main Topics   • Smoking status: Never Smoker   • Smokeless tobacco: Never Used   • Alcohol use 4.2 oz/week     7 Shots of liquor per week      Comment: occasional 2 drinks   • Drug use: No   • Sexual activity: Not on file     Other Topics Concern   • Not on file     Social History Narrative   • No narrative on file     No Known Allergies  Outpatient Encounter Prescriptions as of 12/31/2018   Medication Sig Dispense Refill   • clopidogrel (PLAVIX) 75 MG Tab Take 1 Tab by mouth every day. 30 Tab 11  "  • ezetimibe (ZETIA) 10 MG Tab Take 1 Tab by mouth every day. 30 Tab 11   • metoprolol (LOPRESSOR) 25 MG Tab Take 1 Tab by mouth 2 Times a Day. 60 Tab 11   • rosuvastatin (CRESTOR) 40 MG tablet Take 1 Tab by mouth every evening. 30 Tab 11   • pantoprazole (PROTONIX) 40 MG Tablet Delayed Response Take 1 Tab by mouth every day. 30 Tab 2   • aspirin EC 81 MG EC tablet Take 1 Tab by mouth every day. 30 Tab 0   • [DISCONTINUED] clopidogrel (PLAVIX) 75 MG Tab Take 1 Tab by mouth every day. 30 Tab 0   • [DISCONTINUED] ezetimibe (ZETIA) 10 MG Tab Take 1 Tab by mouth every day. 30 Tab 0   • [DISCONTINUED] metoprolol (LOPRESSOR) 25 MG Tab Take 1 Tab by mouth 2 Times a Day. 60 Tab 0   • [DISCONTINUED] rosuvastatin (CRESTOR) 40 MG tablet Take 1 Tab by mouth every evening. 30 Tab 11   • ibuprofen (MOTRIN) 200 MG Tab Take 400 mg by mouth every 6 hours as needed for Mild Pain.       No facility-administered encounter medications on file as of 12/31/2018.      Review of Systems   Constitutional: Negative for malaise/fatigue.   Respiratory: Negative for cough and shortness of breath.    Cardiovascular: Positive for chest pain (Rare stabbing chest pain lasting only seconds.). Negative for palpitations, orthopnea, claudication, leg swelling and PND.   Gastrointestinal: Negative for abdominal pain.   Musculoskeletal: Positive for back pain (chronic). Negative for myalgias.   Neurological: Negative for dizziness and weakness.   Psychiatric/Behavioral: The patient is nervous/anxious (About his health.).         Objective:   /84 (BP Location: Left arm, Patient Position: Sitting, BP Cuff Size: Adult)   Pulse 66   Ht 1.702 m (5' 7\")   Wt 99.3 kg (219 lb)   SpO2 93%   BMI 34.30 kg/m²     Physical Exam   Constitutional: He is oriented to person, place, and time. He appears well-developed and well-nourished.   Muscular male in no acute distress.   HENT:   Head: Normocephalic.   Eyes: EOM are normal.   Neck: Normal range of motion. " No JVD present.   Cardiovascular: Normal rate and regular rhythm.  Exam reveals no friction rub.    No murmur heard.  Pulmonary/Chest: Effort normal.   Abdominal: Soft. Bowel sounds are normal.   Musculoskeletal: He exhibits no edema.   Neurological: He is alert and oriented to person, place, and time.   Skin: Skin is warm and dry.   Psychiatric: He has a normal mood and affect.     Results for SANCHO DIAZ (MRN [id number])    Ref. Range 12/12/2018 23:58   WBC Latest Ref Range: 4.8 - 10.8 K/uL 10.1   RBC Latest Ref Range: 4.70 - 6.10 M/uL 4.74   Hemoglobin Latest Ref Range: 14.0 - 18.0 g/dL 15.2   Hematocrit Latest Ref Range: 42.0 - 52.0 % 45.4   MCV Latest Ref Range: 81.4 - 97.8 fL 95.8   MCH Latest Ref Range: 27.0 - 33.0 pg 32.1   MCHC Latest Ref Range: 33.7 - 35.3 g/dL 33.5 (L)   RDW Latest Ref Range: 35.9 - 50.0 fL 41.7   Platelet Count Latest Ref Range: 164 - 446 K/uL 185   MPV Latest Ref Range: 9.0 - 12.9 fL 9.8   Sodium Latest Ref Range: 135 - 145 mmol/L 135   Potassium Latest Ref Range: 3.6 - 5.5 mmol/L 3.7   Chloride Latest Ref Range: 96 - 112 mmol/L 103   Co2 Latest Ref Range: 20 - 33 mmol/L 24   Anion Gap Latest Ref Range: 0.0 - 11.9  8.0   Glucose Latest Ref Range: 65 - 99 mg/dL 100 (H)   Bun Latest Ref Range: 8 - 22 mg/dL 16   Creatinine Latest Ref Range: 0.50 - 1.40 mg/dL 1.03   GFR If  Latest Ref Range: >60 mL/min/1.73 m 2 >60   GFR If Non  Latest Ref Range: >60 mL/min/1.73 m 2 >60   Calcium Latest Ref Range: 8.5 - 10.5 mg/dL 9.1   Results for SANCHO DIAZ (MRN [id number])    Ref. Range 12/12/2018 00:28   Cholesterol,Tot Latest Ref Range: 100 - 199 mg/dL 296 (H)   Triglycerides Latest Ref Range: 0 - 149 mg/dL 119   HDL Latest Ref Range: >=40 mg/dL 62   LDL Latest Ref Range: <100 mg/dL 210 (H)       December 12, 2018: Transthoracic Echo Report  CONCLUSIONS  No prior study is available for comparison.   Left ventricular ejection fraction is visually estimated to be  65%.  Normal diastolic function.  Normal right ventricular size and systolic function.  No significant valve disease or flow abnormalities.     Assessment:     1. Coronary artery disease involving native coronary artery of native heart with unstable angina pectoris (HCC)  metoprolol (LOPRESSOR) 25 MG Tab   2. NSTEMI (non-ST elevated myocardial infarction) (HCC)  clopidogrel (PLAVIX) 75 MG Tab   3. Status post insertion of drug eluting coronary artery stent     4. Mixed hyperlipidemia  ezetimibe (ZETIA) 10 MG Tab    rosuvastatin (CRESTOR) 40 MG tablet    LIPID PANEL    COMP METABOLIC PANEL   5. Essential hypertension         Medical Decision Making:  Today's Assessment / Status / Plan:     Coronary artery disease: Status post stenting to the 99% first diagonal.  Patient does have a 95% occlusion of the small caliber ramus.  This will be treated medically.    He has not had any anginal symptoms is very worried about his health since he is such a young man and does not have positive family history for coronary artery disease.  He knows to be on dual antiplatelet therapy for a year    Hyperlipidemia: His LDL is extremely elevated at 210.  He has been started on rosuvastatin 40 mg and Zetia 10 mg.  We will check lipids and metabolic panel in 3 months.  His LDL goal is 70 or less.  If he does not achieve this with these medications we will need to consider a PC SK 9 inhibitor.  I will refer the patient to the lipid clinic to be followed by Dr. Michael Bloch.    Hypertension: His blood pressure is good in the office today.  He will continue on metoprolol for blood pressure control as well as his coronary disease.    He would like to return to work on January 2, 2019.  Agreeable as long as he does not do any heavy lifting.  He works full-time and has been referred to cardiac rehab.  I have encouraged him to go to a few sessions so that he can learn the level of exercise to undertake.  He normally does weight lifting and I  have encouraged him to switch to cardio.    He will follow-up in 3 months with Dr Valles with lab work prior.  He will also follow-up with the lipid clinic.  He will follow-up sooner if problems.    Collaborating Provider: Dr Courtney.

## 2019-01-01 NOTE — PROCEDURES
DATE OF SERVICE:  12/12/2018    PROCEDURE:  Cardiac catheterization and percutaneous coronary intervention.  A.  Left heart catheterization.  B.  Left ventriculography.  C.  Selective coronary angiography.  D.  Coronary stent implantation, proximal medial branch of a large   trifurcating diagonal vessel, 2.75x15 mm Xience drug-eluting stent.  E.  Right radial artery approach.  F.  Conscious sedation supervision.    PREPROCEDURE DIAGNOSES:  1.  Non-ST elevation myocardial infarction.  2.  Hypertension.  3.  Hyperlipidemia.  4.  Obesity.    POSTPROCEDURE DIAGNOSES:  1.  Coronary artery disease involving the proximal medial branch, a large   bifurcating diagonal branch or diagonal vessel and proximal very small caliber   ramus intermedius vessel.  2.  Normal left ventricular ejection fraction 67% with anterior wall akinesis.    PHYSICIAN:  Mj Valles MD    REFERRING PHYSICIAN:  Nahum Bar MD    COMPLICATIONS:  None.    MEDICATIONS:  1.  Versed 2 mg IV.  2.  Fentanyl 100 mcg IV.  3.  Lidocaine 2% subcutaneous.  4.  Heparin 2000 units IA.  5.  Nitroglycerin 100 mcg IA.  6.  Verapamil 2.5 mg IA.  7.  Nitroglycerin 180 mcg intracoronary.  8.  Heparin intravenous infusion discontinued.    INDICATIONS:  The patient is a 43-year-old male with a history of   hypertension, hyperlipidemia, and obesity who was admitted to [facility] [place] on 12[place]11/2018 with an acute coronary syndrome consistent with a non-ST   elevation myocardial infarction.  The patient is referred for a diagnostic   cardiac catheterization for post-MI risk stratification for future coronary   events.    PROCEDURE:  After an informed consent was obtained, the patient was brought to   the cardiac catheterization laboratory where he was prepped, draped and   anesthetized in the usual manner.    After having established adequate collateral circulation to the right hand   with a pressure and oximetry-guided Kodi's test, the volar  surface of the   right wrist was prepped, draped, and anesthetized in usual manner.      Using ultrasound guidance and modified Seldinger technique, a 6-Tskxocm61 cm   introducer sheath was inserted in the right radial artery.  Heparin,   verapamil, and nitroglycerin were given via the side port.    Next, a 5-Tuvaluan TIG catheter was inserted in the ostium of the left coronary   artery and left coronary angiograms were obtained in various projections.    Using the same catheter, the right coronary artery was cannulated and   angiograms were obtained.    Next, using an exchange length wire, a 4-Tuvaluan pigtail catheter was advanced   in the left ventricle under fluoroscopic guidance.  A single plane OAKES left   ventricular angiogram was performed.  Pre and post-angiogram LVEDP, LV and   aortic pressures were obtained.    Initial review of the angiograms demonstrated a subtotal 99% occlusion of the   proximal medial branch of a large bifurcating diagonal vessel and proximal disease   of very small caliber ramus intermedius vessel.  It was therefore elected to   proceed with coronary intervention of the diagonal branch vessel.    Next, Angiomax was given.    Next, a 6-Tuvaluan extra backup 3.5 guiding catheter was inserted in the ostium   of the left coronary artery.    Next, a 0.014 Whisper wire to negotiate the distal medial branch of the   diagonal vessel.    Next, a 2.5x12 mm balloon catheter was inserted across the area of stenosis   and inflated at 6 and 12 atmospheres.  The balloon was removed.  IC   nitroglycerin was given.    Next, a 2.75x15 mm drug-eluting Xience stent was then placed across the   residual stenosis and deployed at 10 atmospheres with post-deployment   inflations of 14-15 atmospheres.  The balloon wire removed and final   angiograms were performed.    Catheters were removed and hemostasis was achieved with a wrist band device.    The patient tolerated the procedure well.      Conscious sedation: I  supervised and monitored the administration of   intravenous conscious sedation from 3:51 p.m. until 4:32 p.m.    FINDINGS:  HEMODYNAMICS:  LEFT HEART PRESSURES:  1.  LVEDP 15 mmHg.  2.  Left ventricular systolic function 164 mmHg.  3.  Central aortic pressure systolic 139, diastolic 91, mean of 111 mmHg.  4.  Pullback pressure 25 mmHg suggestive of aortic stenosis.    LEFT VENTRICULOGRAPHY:  Left ventricular chamber size is normal with anterior   wall akinesis involving the distal half of the anterior wall.  There is   normal contractility of the remaining myocardium.  Calculated ejection   fraction 67%.    CORONARY ARTERIOGRAPHY:  1.  Left main artery:  Left main is a large caliber vessel, angiographically   normal and trifurcates into the left anterior descending artery, a very small   caliber ramus intermedius vessel and a codominant circumflex artery.  2.  Left anterior descending artery:  The left anterior descending artery is a   moderately large caliber vessel, gives rise to a large bifurcating first   diagonal vessel, a small caliber second diagonal branch, and a normal   complement of septal branches and extends to supply the apex.  The left   anterior descending artery has diffuse intimal atheromatous disease, but no   high-grade stenosis.  The medial branch of the first diagonal vessel has a   proximal subtotal 99% stenosis with FLORENCIA-2 antegrade flow.  3.  Ramus intermedius:  The ramus is a very small caliber vessel and has a   proximal 90% stenosis.  4.  Circumflex artery:  The circumflex is codominant, gives rise to 2 major   moderate to large caliber marginal branches and a bifurcating posterolateral   branch.  The circumflex artery has diffuse atheromatous disease with   significant disease involving the ostium of a very small caliber distal   marginal and posterolateral branches.  Otherwise, the remaining vessels are   widely patent.  The first marginal branch has an ostial 50% stenosis.  5.  Right  coronary artery:  The right coronary is a large caliber vessel,   gives rise to an RV branch, a large posterior descending artery and a small   posterolateral system.  The right coronary artery is widely patent with mild   smooth focal intimal atheromatous disease.    POST STENT IMPLANTATION of the proximal medial branch of the first diagonal   vessel with a 2.75x15 mm Xience drug-eluting stent demonstrates good stent   expansion, 0% residual stenosis, no evidence of dissection or thrombus and   FLORENCIA 3 antegrade flow.    PLAN  1.  Postprocedure IV Angiomax x4 hours.  2.  Dual antiplatelet therapy with Plavix and aspirin.  3.  Maximize optimal medical therapy with metoprolol, Crestor, and Zetia.       ____________________________________     MD CHLOÉ CASPER / NTS    DD:  12/31/2018 21:46:41  DT:  12/31/2018 22:49:31    D#:  [id number]  Job#:  [id number]

## 2019-01-03 NOTE — TELEPHONE ENCOUNTER
2nd attempt to call patient, unable to reach.  Left voicemail to let him know that return to work letter will be sent to patient's mailing address and to return this call if he has any questions or concerns.    Return to work letter mailed to patient's mailing address.

## 2019-01-11 ENCOUNTER — TELEPHONE (OUTPATIENT)
Dept: CARDIOLOGY | Facility: MEDICAL CENTER | Age: 44
End: 2019-01-11

## 2019-01-11 DIAGNOSIS — Z95.5 STATUS POST INSERTION OF DRUG ELUTING CORONARY ARTERY STENT: ICD-10-CM

## 2019-01-11 RX ORDER — ASPIRIN 81 MG/1
81 TABLET ORAL DAILY
Qty: 90 TAB | Refills: 3 | Status: SHIPPED | OUTPATIENT
Start: 2019-01-11 | End: 2019-12-26

## 2019-01-11 NOTE — TELEPHONE ENCOUNTER
"CHEYENNE Kennedy 15 hours ago (5:38 PM)     Lina Morales. I picked up my medications this evening and noticed I did not have a prescription for the baby aspirin.  Can you send one in for me or if I need to simply buy something over the counter, which one do you recommend? I know through prescription, it was free. Thank you.      Upon chart review, per APN's recommendation from last OV note, \"He knows to be on dual antiplatelet therapy for a year\"    Aspirin reordered and sent to pt preferred pharmacy.    Notified patient through [employer].  "

## 2019-01-28 ENCOUNTER — HOSPITAL ENCOUNTER (OUTPATIENT)
Dept: LAB | Facility: MEDICAL CENTER | Age: 44
End: 2019-01-28
Attending: NURSE PRACTITIONER
Payer: COMMERCIAL

## 2019-01-28 ENCOUNTER — APPOINTMENT (OUTPATIENT)
Dept: LAB | Facility: MEDICAL CENTER | Age: 44
End: 2019-01-28
Attending: FAMILY MEDICINE
Payer: COMMERCIAL

## 2019-01-28 DIAGNOSIS — E78.2 MIXED HYPERLIPIDEMIA: ICD-10-CM

## 2019-01-28 LAB
ALBUMIN SERPL BCP-MCNC: 4.6 G/DL (ref 3.2–4.9)
ALBUMIN/GLOB SERPL: 1.4 G/DL
ALP SERPL-CCNC: 84 U/L (ref 30–99)
ALT SERPL-CCNC: 43 U/L (ref 2–50)
ANION GAP SERPL CALC-SCNC: 8 MMOL/L (ref 0–11.9)
AST SERPL-CCNC: 30 U/L (ref 12–45)
BILIRUB SERPL-MCNC: 0.5 MG/DL (ref 0.1–1.5)
BUN SERPL-MCNC: 18 MG/DL (ref 8–22)
CALCIUM SERPL-MCNC: 9.9 MG/DL (ref 8.5–10.5)
CHLORIDE SERPL-SCNC: 103 MMOL/L (ref 96–112)
CHOLEST SERPL-MCNC: 114 MG/DL (ref 100–199)
CO2 SERPL-SCNC: 27 MMOL/L (ref 20–33)
CREAT SERPL-MCNC: 1.16 MG/DL (ref 0.5–1.4)
FASTING STATUS PATIENT QL REPORTED: NORMAL
GLOBULIN SER CALC-MCNC: 3.3 G/DL (ref 1.9–3.5)
GLUCOSE SERPL-MCNC: 106 MG/DL (ref 65–99)
HDLC SERPL-MCNC: 48 MG/DL
LDLC SERPL CALC-MCNC: 46 MG/DL
POTASSIUM SERPL-SCNC: 4.1 MMOL/L (ref 3.6–5.5)
PROT SERPL-MCNC: 7.9 G/DL (ref 6–8.2)
SODIUM SERPL-SCNC: 138 MMOL/L (ref 135–145)
TRIGL SERPL-MCNC: 101 MG/DL (ref 0–149)

## 2019-01-28 PROCEDURE — 80061 LIPID PANEL: CPT

## 2019-01-28 PROCEDURE — 80053 COMPREHEN METABOLIC PANEL: CPT

## 2019-01-28 PROCEDURE — 36415 COLL VENOUS BLD VENIPUNCTURE: CPT

## 2019-02-01 ENCOUNTER — OFFICE VISIT (OUTPATIENT)
Dept: MEDICAL GROUP | Facility: MEDICAL CENTER | Age: 44
End: 2019-02-01
Payer: COMMERCIAL

## 2019-02-01 VITALS
RESPIRATION RATE: 16 BRPM | HEIGHT: 67 IN | WEIGHT: 217 LBS | DIASTOLIC BLOOD PRESSURE: 72 MMHG | SYSTOLIC BLOOD PRESSURE: 124 MMHG | OXYGEN SATURATION: 96 % | BODY MASS INDEX: 34.06 KG/M2 | HEART RATE: 64 BPM

## 2019-02-01 DIAGNOSIS — I25.110 CORONARY ARTERY DISEASE INVOLVING NATIVE CORONARY ARTERY OF NATIVE HEART WITH UNSTABLE ANGINA PECTORIS (HCC): ICD-10-CM

## 2019-02-01 DIAGNOSIS — R73.01 IMPAIRED FASTING GLUCOSE: ICD-10-CM

## 2019-02-01 DIAGNOSIS — E78.2 MIXED HYPERLIPIDEMIA: ICD-10-CM

## 2019-02-01 DIAGNOSIS — I21.4 NSTEMI (NON-ST ELEVATED MYOCARDIAL INFARCTION) (HCC): ICD-10-CM

## 2019-02-01 DIAGNOSIS — I10 ESSENTIAL HYPERTENSION: ICD-10-CM

## 2019-02-01 DIAGNOSIS — K21.9 GASTROESOPHAGEAL REFLUX DISEASE, ESOPHAGITIS PRESENCE NOT SPECIFIED: ICD-10-CM

## 2019-02-01 PROCEDURE — 99214 OFFICE O/P EST MOD 30 MIN: CPT | Performed by: NURSE PRACTITIONER

## 2019-02-01 RX ORDER — PANTOPRAZOLE SODIUM 40 MG/1
40 TABLET, DELAYED RELEASE ORAL DAILY
Qty: 90 TAB | Refills: 3 | Status: SHIPPED | OUTPATIENT
Start: 2019-02-01 | End: 2020-02-21

## 2019-02-01 ASSESSMENT — ENCOUNTER SYMPTOMS: HEARTBURN: 1

## 2019-02-01 ASSESSMENT — PATIENT HEALTH QUESTIONNAIRE - PHQ9: CLINICAL INTERPRETATION OF PHQ2 SCORE: 0

## 2019-02-01 NOTE — PROGRESS NOTES
Subjective:      Jose Juan Hatch is a 43 y.o. male who presents with Establish Care        CC: Patient is here today to establish care with the PCP and follow-up on a number of issues including recent non-STEMI, hypertension, acid reflux and impaired fasting glucose.    HPI Jose Juan Hatch        1. NSTEMI (non-ST elevated myocardial infarction) (HCC)  Patient went to the emergency room on December 11 with complaints of chest pain and his EKG and troponins were found to be abnormal.  Subsequent Cath Lab findings showed 99% occlusion of the coronary artery and stenting was done.  He has been following with cardiology and reports no new chest pain.    2. Coronary artery disease involving native coronary artery of native heart with unstable angina pectoris (HCC)  Patient had successful stenting and is now taking Plavix and aspirin as well as his Zetia and Crestor as prescribed.  His most recent LDL is below 70 and he has had no side effects from the medicine.  He denies angina.    3. Essential hypertension  Blood pressure appears well-controlled on his beta-blocker at 124/72 and no reports of dizziness.    4. Mixed hyperlipidemia  Patient taking his Crestor and Zetia with good control of cholesterol.    5. Gastroesophageal reflux disease, esophagitis presence not specified  Patient states he has been on some form of PPI for at least 5-6 years.  He states about 5 years ago he was diagnosed with H. pylori at a primary care office or urgent care but did not complete treatment.  He states since then he has had to use an over-the-counter PPI on a daily basis.  He tried switching to Zantac and has in the past tried stopping medicine but develops severe acid reflux.  He is currently on Protonix 40 mg daily which need to be switched because of his Plavix.  He has never had an EGD.    6. Impaired fasting glucose  Patient has family history of diabetes and his hemoglobin A1c at the hospital was mildly elevated at 5.9.  He  "admits he does not follow a low-carb diet  Current Outpatient Prescriptions   Medication Sig Dispense Refill   • pantoprazole (PROTONIX) 40 MG Tablet Delayed Response Take 1 Tab by mouth every day. 90 Tab 3   • aspirin 81 MG EC tablet Take 1 Tab by mouth every day. 90 Tab 3   • clopidogrel (PLAVIX) 75 MG Tab Take 1 Tab by mouth every day. 30 Tab 11   • ezetimibe (ZETIA) 10 MG Tab Take 1 Tab by mouth every day. 30 Tab 11   • metoprolol (LOPRESSOR) 25 MG Tab Take 1 Tab by mouth 2 Times a Day. 60 Tab 11   • rosuvastatin (CRESTOR) 40 MG tablet Take 1 Tab by mouth every evening. 30 Tab 11     No current facility-administered medications for this visit.      Social History   Substance Use Topics   • Smoking status: Never Smoker   • Smokeless tobacco: Never Used   • Alcohol use 4.2 oz/week     7 Shots of liquor per week      Comment: occasional 2 drinks     Past Medical History:   Diagnosis Date   • Acid reflux    • CAD (coronary artery disease) 12/12/2018    stent to 1st diagonal.   • Kidney stone      Family History   Problem Relation Age of Onset   • Hypertension Mother    • Other Father         prostate CA   • Diabetes Father        Review of Systems   Gastrointestinal: Positive for heartburn.   All other systems reviewed and are negative.         Objective:     /72 (BP Location: Right arm, Patient Position: Sitting, BP Cuff Size: Adult)   Pulse 64   Resp 16   Ht 1.702 m (5' 7\")   Wt 98.4 kg (217 lb)   SpO2 96%   BMI 33.99 kg/m²      Physical Exam   Constitutional: He is oriented to person, place, and time. He appears well-developed and well-nourished. No distress.   HENT:   Head: Normocephalic and atraumatic.   Right Ear: External ear normal.   Left Ear: External ear normal.   Nose: Nose normal.   Mouth/Throat: Oropharynx is clear and moist.   Eyes: Conjunctivae are normal. Right eye exhibits no discharge. Left eye exhibits no discharge.   Neck: Normal range of motion. Neck supple. No tracheal deviation " present. No thyromegaly present.   Cardiovascular: Normal rate, regular rhythm and normal heart sounds.    No murmur heard.  Pulmonary/Chest: Effort normal and breath sounds normal. No respiratory distress. He has no wheezes. He has no rales.   Abdominal: Soft. Bowel sounds are normal. He exhibits no distension and no mass. There is no tenderness. There is no rebound and no guarding. No hernia.   Lymphadenopathy:     He has no cervical adenopathy.   Neurological: He is alert and oriented to person, place, and time. Coordination normal.   Skin: Skin is warm and dry. No rash noted. He is not diaphoretic. No erythema.   Psychiatric: He has a normal mood and affect. His behavior is normal. Judgment and thought content normal.   Nursing note and vitals reviewed.              Assessment/Plan:     1. NSTEMI (non-ST elevated myocardial infarction) (HCC)  Patient reports no anginal pain and has been following with cardiology.  He does not think he is going to have time to go to cardiac rehab.  I advised him to slowly start increasing his activities with walking and then discuss with cardiology when he can increase his activities.  He appears to be on all the right medications.    2. Coronary artery disease involving native coronary artery of native heart with unstable angina pectoris (HCC)  Patient will continue Plavix and aspirin for the next year and will continue on his statin.    3. Essential hypertension  Blood pressure currently well controlled on his beta-blocker.    4. Mixed hyperlipidemia  LDL is below 70 on his combination of Crestor and Zetia and he reports no myalgias    5. Gastroesophageal reflux disease, esophagitis presence not specified  Patient has been on a PPI for 5-6 years.  He reports that he cannot do without a PPI or he will develop severe indigestion.  Based on this, I would like to get him to gastroenterology to find out why it is necessary for him to be on a PPI for so long.  He does report that he  had previous H. pylori which was inadequately treated about 5 years ago.  He will continue on his Protonix for now.  - REFERRAL TO GASTROENTEROLOGY  - pantoprazole (PROTONIX) 40 MG Tablet Delayed Response; Take 1 Tab by mouth every day.  Dispense: 90 Tab; Refill: 3    6. Impaired fasting glucose  I explained to patient he is high risk for type 2 diabetes with his family history and weight.  He is currently prediabetic and I will have him recheck a hemoglobin A1c in 3-6 months and follow a low carbohydrate diet.  - HEMOGLOBIN A1C; Future

## 2019-04-08 ENCOUNTER — OFFICE VISIT (OUTPATIENT)
Dept: CARDIOLOGY | Facility: MEDICAL CENTER | Age: 44
End: 2019-04-08
Payer: COMMERCIAL

## 2019-04-08 VITALS
BODY MASS INDEX: 33.59 KG/M2 | HEIGHT: 67 IN | WEIGHT: 214 LBS | SYSTOLIC BLOOD PRESSURE: 126 MMHG | HEART RATE: 60 BPM | OXYGEN SATURATION: 96 % | DIASTOLIC BLOOD PRESSURE: 88 MMHG

## 2019-04-08 DIAGNOSIS — E78.2 MIXED HYPERLIPIDEMIA: ICD-10-CM

## 2019-04-08 DIAGNOSIS — I25.10 CORONARY ARTERY DISEASE, OCCLUSIVE: ICD-10-CM

## 2019-04-08 DIAGNOSIS — Z95.5 STATUS POST INSERTION OF DRUG ELUTING CORONARY ARTERY STENT: ICD-10-CM

## 2019-04-08 DIAGNOSIS — I10 ESSENTIAL HYPERTENSION: ICD-10-CM

## 2019-04-08 PROCEDURE — 99214 OFFICE O/P EST MOD 30 MIN: CPT | Performed by: INTERNAL MEDICINE

## 2019-04-08 ASSESSMENT — ENCOUNTER SYMPTOMS
PALPITATIONS: 0
SHORTNESS OF BREATH: 0
MYALGIAS: 0
LOSS OF CONSCIOUSNESS: 0
DIZZINESS: 0
COUGH: 0

## 2019-04-08 NOTE — PROGRESS NOTES
Chief Complaint   Patient presents with   • Coronary Artery Disease       Subjective:   Jose Juan Hatch is a 43 y.o. male who presents today for follow-up evaluation of CAD, diagonal stent, hypertension and hyperlipidemia.    Last seen on 12/31/2018 by APN    Since 12/01/2018 the patient has had no cardiac symptoms.  Is not able to enroll in cardiac rehab because of work scheduling conflict.  Compliant tolerating medications.    Past Medical History:   Diagnosis Date   • Acid reflux    • CAD (coronary artery disease) 12/12/2018    stent to 1st diagonal.   • Kidney stone      Past Surgical History:   Procedure Laterality Date   • ZZZ CARDIAC CATH  12/12/2018    Rasheed to 1st diagonal.     Family History   Problem Relation Age of Onset   • Hypertension Mother    • Other Father         prostate CA   • Diabetes Father      Social History     Social History   • Marital status:      Spouse name: N/A   • Number of children: N/A   • Years of education: N/A     Occupational History   • Not on file.     Social History Main Topics   • Smoking status: Never Smoker   • Smokeless tobacco: Never Used   • Alcohol use 4.2 oz/week     7 Shots of liquor per week      Comment: occasional 2 drinks   • Drug use: No   • Sexual activity: Yes     Partners: Female     Other Topics Concern   • Not on file     Social History Narrative   • No narrative on file     No Known Allergies  Outpatient Encounter Prescriptions as of 4/8/2019   Medication Sig Dispense Refill   • pantoprazole (PROTONIX) 40 MG Tablet Delayed Response Take 1 Tab by mouth every day. 90 Tab 3   • aspirin 81 MG EC tablet Take 1 Tab by mouth every day. 90 Tab 3   • clopidogrel (PLAVIX) 75 MG Tab Take 1 Tab by mouth every day. 30 Tab 11   • ezetimibe (ZETIA) 10 MG Tab Take 1 Tab by mouth every day. 30 Tab 11   • metoprolol (LOPRESSOR) 25 MG Tab Take 1 Tab by mouth 2 Times a Day. 60 Tab 11   • rosuvastatin (CRESTOR) 40 MG tablet Take 1 Tab by mouth every evening. 30 Tab 11  "    No facility-administered encounter medications on file as of 4/8/2019.      Review of Systems   Respiratory: Negative for cough and shortness of breath.    Cardiovascular: Negative for chest pain and palpitations.   Musculoskeletal: Negative for myalgias.   Neurological: Negative for dizziness and loss of consciousness.        Objective:   /88 (BP Location: Left arm, Patient Position: Sitting, BP Cuff Size: Adult)   Pulse 60   Ht 1.702 m (5' 7\")   Wt 97.1 kg (214 lb)   SpO2 96%   BMI 33.52 kg/m²     Physical Exam   Constitutional: He is oriented to person, place, and time. He appears well-developed and well-nourished.   Eyes: Pupils are equal, round, and reactive to light. Conjunctivae are normal.   Neck: Normal range of motion. Neck supple. No JVD present.   Cardiovascular: Normal rate, regular rhythm, normal heart sounds and intact distal pulses.    Pulmonary/Chest: Effort normal and breath sounds normal. No accessory muscle usage. No respiratory distress. He has no wheezes. He has no rales.   Musculoskeletal: He exhibits no edema.   Neurological: He is alert and oriented to person, place, and time.   Skin: Skin is warm, dry and intact. No rash noted. No cyanosis. Nails show no clubbing.   Psychiatric: He has a normal mood and affect. His behavior is normal.     CARDIAC CATHETERIZATION: 12/12/2018  PROCEDURE:  Cardiac catheterization and percutaneous coronary intervention.  A.  Left heart catheterization.  B.  Left ventriculography.  C.  Selective coronary angiography.  D.  Coronary stent implantation, proximal medial branch of a large   trifurcating diagonal vessel, 2.75x15 mm Xience drug-eluting stent.  PREPROCEDURE DIAGNOSES:  1.  Non-ST elevation myocardial infarction.  POSTPROCEDURE DIAGNOSES:  1.  Coronary artery disease involving the proximal medial branch, a large   bifurcating diagonal branch or diagonal vessel and proximal very small caliber   ramus intermedius vessel.  2.  Normal left " ventricular ejection fraction 67% with anterior wall akinesis.    Assessment:     1. Coronary artery disease, occlusive     2. Status post insertion of drug eluting coronary artery stent     3. Essential hypertension     4. Mixed hyperlipidemia       Medical Decision Making:  Today's Assessment / Status / Plan:     Assessment  1.  CAD, clinically stable  2.  Diagonal stent 2.75 x 15 mm GAL.  12/12/2018.  3.  Hypertension.  Controlled.  4.  Hyperlipidemia.  On Crestor.    Recommendation and Discussion  1.  I reviewed with the patient and his wife his coronary angiogram images.  2.  I reviewed his lipid panel on 1/2019 which shows normal lipid panel.  3.  Continue current cardiac therapy.  4.  We will have cardiac rehab given a home rehab program schedule.  5.  Follow-up 6 months.

## 2019-10-02 DIAGNOSIS — I21.4 NSTEMI (NON-ST ELEVATED MYOCARDIAL INFARCTION) (HCC): ICD-10-CM

## 2019-10-02 RX ORDER — CLOPIDOGREL BISULFATE 75 MG/1
75 TABLET ORAL DAILY
Qty: 30 TAB | Refills: 11 | Status: SHIPPED | OUTPATIENT
Start: 2019-10-02 | End: 2020-05-27

## 2019-12-16 DIAGNOSIS — I21.4 NSTEMI (NON-ST ELEVATED MYOCARDIAL INFARCTION) (HCC): ICD-10-CM

## 2019-12-25 DIAGNOSIS — Z95.5 STATUS POST INSERTION OF DRUG ELUTING CORONARY ARTERY STENT: ICD-10-CM

## 2019-12-25 DIAGNOSIS — E78.2 MIXED HYPERLIPIDEMIA: ICD-10-CM

## 2019-12-25 DIAGNOSIS — I25.110 CORONARY ARTERY DISEASE INVOLVING NATIVE CORONARY ARTERY OF NATIVE HEART WITH UNSTABLE ANGINA PECTORIS (HCC): ICD-10-CM

## 2020-01-07 DIAGNOSIS — E78.2 MIXED HYPERLIPIDEMIA: ICD-10-CM

## 2020-01-07 RX ORDER — ASPIRIN 81 MG/1
TABLET ORAL
Qty: 90 TAB | Refills: 0 | Status: SHIPPED | OUTPATIENT
Start: 2020-01-07 | End: 2020-03-30

## 2020-01-07 RX ORDER — EZETIMIBE 10 MG/1
TABLET ORAL
Qty: 90 TAB | Refills: 0 | Status: SHIPPED | OUTPATIENT
Start: 2020-01-07 | End: 2020-03-30

## 2020-01-08 ENCOUNTER — TELEPHONE (OUTPATIENT)
Dept: CARDIOLOGY | Facility: MEDICAL CENTER | Age: 45
End: 2020-01-08

## 2020-01-08 RX ORDER — ROSUVASTATIN CALCIUM 40 MG/1
TABLET, COATED ORAL
Qty: 90 TAB | Refills: 0 | Status: SHIPPED | OUTPATIENT
Start: 2020-01-08 | End: 2020-06-02 | Stop reason: SDUPTHER

## 2020-01-08 NOTE — TELEPHONE ENCOUNTER
Attempted to call pt. Regarding upcoming appt. W/ SW on 1/22. Line was busy. I would like to know if pt. has had recent labs/imaging if so where?

## 2020-01-22 DIAGNOSIS — I21.4 NSTEMI (NON-ST ELEVATED MYOCARDIAL INFARCTION) (HCC): ICD-10-CM

## 2020-01-22 DIAGNOSIS — I25.10 CORONARY ARTERY DISEASE, OCCLUSIVE: ICD-10-CM

## 2020-01-22 DIAGNOSIS — E78.2 MIXED HYPERLIPIDEMIA: ICD-10-CM

## 2020-01-23 ENCOUNTER — HOSPITAL ENCOUNTER (OUTPATIENT)
Dept: LAB | Facility: MEDICAL CENTER | Age: 45
End: 2020-01-23
Attending: INTERNAL MEDICINE
Payer: COMMERCIAL

## 2020-01-23 DIAGNOSIS — I21.4 NSTEMI (NON-ST ELEVATED MYOCARDIAL INFARCTION) (HCC): ICD-10-CM

## 2020-01-23 DIAGNOSIS — E78.2 MIXED HYPERLIPIDEMIA: ICD-10-CM

## 2020-01-23 DIAGNOSIS — I25.10 CORONARY ARTERY DISEASE, OCCLUSIVE: ICD-10-CM

## 2020-01-23 LAB
ALBUMIN SERPL BCP-MCNC: 4.5 G/DL (ref 3.2–4.9)
ALBUMIN/GLOB SERPL: 1.4 G/DL
ALP SERPL-CCNC: 72 U/L (ref 30–99)
ALT SERPL-CCNC: 33 U/L (ref 2–50)
ANION GAP SERPL CALC-SCNC: 8 MMOL/L (ref 0–11.9)
AST SERPL-CCNC: 29 U/L (ref 12–45)
BILIRUB SERPL-MCNC: 0.4 MG/DL (ref 0.1–1.5)
BUN SERPL-MCNC: 19 MG/DL (ref 8–22)
CALCIUM SERPL-MCNC: 9.6 MG/DL (ref 8.5–10.5)
CHLORIDE SERPL-SCNC: 105 MMOL/L (ref 96–112)
CHOLEST SERPL-MCNC: 116 MG/DL (ref 100–199)
CO2 SERPL-SCNC: 25 MMOL/L (ref 20–33)
CREAT SERPL-MCNC: 1.1 MG/DL (ref 0.5–1.4)
FASTING STATUS PATIENT QL REPORTED: NORMAL
GLOBULIN SER CALC-MCNC: 3.3 G/DL (ref 1.9–3.5)
GLUCOSE SERPL-MCNC: 101 MG/DL (ref 65–99)
HDLC SERPL-MCNC: 43 MG/DL
LDLC SERPL CALC-MCNC: 15 MG/DL
POTASSIUM SERPL-SCNC: 4.3 MMOL/L (ref 3.6–5.5)
PROT SERPL-MCNC: 7.8 G/DL (ref 6–8.2)
SODIUM SERPL-SCNC: 138 MMOL/L (ref 135–145)
TRIGL SERPL-MCNC: 291 MG/DL (ref 0–149)

## 2020-01-23 PROCEDURE — 36415 COLL VENOUS BLD VENIPUNCTURE: CPT

## 2020-01-23 PROCEDURE — 80053 COMPREHEN METABOLIC PANEL: CPT

## 2020-01-23 PROCEDURE — 80061 LIPID PANEL: CPT

## 2020-02-06 ENCOUNTER — OFFICE VISIT (OUTPATIENT)
Dept: CARDIOLOGY | Facility: MEDICAL CENTER | Age: 45
End: 2020-02-06
Payer: COMMERCIAL

## 2020-02-06 VITALS
SYSTOLIC BLOOD PRESSURE: 124 MMHG | HEIGHT: 67 IN | WEIGHT: 209 LBS | HEART RATE: 62 BPM | DIASTOLIC BLOOD PRESSURE: 86 MMHG | BODY MASS INDEX: 32.8 KG/M2 | OXYGEN SATURATION: 96 %

## 2020-02-06 DIAGNOSIS — I10 ESSENTIAL HYPERTENSION: ICD-10-CM

## 2020-02-06 DIAGNOSIS — I25.10 CORONARY ARTERY DISEASE, OCCLUSIVE: ICD-10-CM

## 2020-02-06 DIAGNOSIS — E78.2 MIXED HYPERLIPIDEMIA: ICD-10-CM

## 2020-02-06 DIAGNOSIS — Z95.5 STATUS POST INSERTION OF DRUG ELUTING CORONARY ARTERY STENT: ICD-10-CM

## 2020-02-06 PROCEDURE — 99214 OFFICE O/P EST MOD 30 MIN: CPT | Performed by: INTERNAL MEDICINE

## 2020-02-06 ASSESSMENT — ENCOUNTER SYMPTOMS
COUGH: 0
PALPITATIONS: 0
LOSS OF CONSCIOUSNESS: 0
DIZZINESS: 0
SHORTNESS OF BREATH: 0
MYALGIAS: 0

## 2020-02-06 NOTE — PROGRESS NOTES
Chief Complaint   Patient presents with   • Coronary Artery Disease     DX: CAD, occlusive, FV   • Results     labs       Subjective:   Jose Juan Hatch is a 44 y.o. male who presents today for follow-up evaluation of CAD, diagonal stent, hypertension and hyperlipidemia.    Last seen on 4/8/2019    Since 4/8/2019 the patient has had no cardiac problems or symptoms.  Compliant with medications.    Since 12/01/2018 the patient has had no cardiac symptoms.  Is not able to enroll in cardiac rehab because of work scheduling conflict.  Compliant tolerating medications.    Past Medical History:   Diagnosis Date   • Acid reflux    • CAD (coronary artery disease) 12/12/2018    stent to 1st diagonal.   • Kidney stone      Past Surgical History:   Procedure Laterality Date   • ZZZ CARDIAC CATH  12/12/2018    Rasheed to 1st diagonal.     Family History   Problem Relation Age of Onset   • Hypertension Mother    • Other Father         prostate CA   • Diabetes Father      Social History     Socioeconomic History   • Marital status:      Spouse name: Not on file   • Number of children: Not on file   • Years of education: Not on file   • Highest education level: Not on file   Occupational History   • Not on file   Social Needs   • Financial resource strain: Not on file   • Food insecurity:     Worry: Not on file     Inability: Not on file   • Transportation needs:     Medical: Not on file     Non-medical: Not on file   Tobacco Use   • Smoking status: Never Smoker   • Smokeless tobacco: Never Used   Substance and Sexual Activity   • Alcohol use: Yes     Alcohol/week: 4.2 oz     Types: 7 Shots of liquor per week     Comment: occasional 2 drinks   • Drug use: No   • Sexual activity: Yes     Partners: Female   Lifestyle   • Physical activity:     Days per week: Not on file     Minutes per session: Not on file   • Stress: Not on file   Relationships   • Social connections:     Talks on phone: Not on file     Gets together: Not on file  "    Attends Hindu service: Not on file     Active member of club or organization: Not on file     Attends meetings of clubs or organizations: Not on file     Relationship status: Not on file   • Intimate partner violence:     Fear of current or ex partner: Not on file     Emotionally abused: Not on file     Physically abused: Not on file     Forced sexual activity: Not on file   Other Topics Concern   • Not on file   Social History Narrative   • Not on file     No Known Allergies  Outpatient Encounter Medications as of 2/6/2020   Medication Sig Dispense Refill   • rosuvastatin (CRESTOR) 40 MG tablet TAKE 1 TABLET BY MOUTH EVERY DAY IN THE EVENING 90 Tab 0   • ezetimibe (ZETIA) 10 MG Tab TAKE 1 TABLET BY MOUTH EVERY DAY 90 Tab 0   • aspirin 81 MG EC tablet TAKE 1 TABLET BY MOUTH EVERY DAY 90 Tab 0   • metoprolol (LOPRESSOR) 25 MG Tab Take 1 Tab by mouth 2 times a day. 180 Tab 0   • clopidogrel (PLAVIX) 75 MG Tab Take 1 Tab by mouth every day. 30 Tab 11   • pantoprazole (PROTONIX) 40 MG Tablet Delayed Response Take 1 Tab by mouth every day. 90 Tab 3     No facility-administered encounter medications on file as of 2/6/2020.      Review of Systems   Respiratory: Negative for cough and shortness of breath.    Cardiovascular: Negative for chest pain and palpitations.   Musculoskeletal: Negative for myalgias.   Neurological: Negative for dizziness and loss of consciousness.        Objective:   /86 (BP Location: Left arm, Patient Position: Sitting, BP Cuff Size: Adult)   Pulse 62   Ht 1.702 m (5' 7\")   Wt 94.8 kg (209 lb)   SpO2 96%   BMI 32.73 kg/m²     Physical Exam   Constitutional: He is oriented to person, place, and time. He appears well-developed and well-nourished.   Eyes: Conjunctivae are normal.   Neck: Normal range of motion. Neck supple. No JVD present.   Cardiovascular: Normal rate, regular rhythm, normal heart sounds and intact distal pulses.   Pulmonary/Chest: Effort normal and breath sounds " normal. No accessory muscle usage. No respiratory distress. He has no wheezes. He has no rales.   Musculoskeletal:         General: No edema.   Neurological: He is alert and oriented to person, place, and time.   Skin: Skin is warm, dry and intact. No rash noted. No cyanosis. Nails show no clubbing.   Psychiatric: He has a normal mood and affect. His behavior is normal.     CARDIAC CATHETERIZATION: 12/12/2018  A.  Left heart catheterization.  B.  Left ventriculography.  C.  Selective coronary angiography.  D.  Coronary stent implantation, proximal medial branch of a large   trifurcating diagonal vessel, 2.75x15 mm Xience drug-eluting stent.  PREPROCEDURE DIAGNOSES:  1.  Non-ST elevation myocardial infarction.  POSTPROCEDURE DIAGNOSES:  1.  Coronary artery disease involving the proximal medial branch, a large bifurcating diagonal branch or diagonal vessel and proximal very small caliber ramus intermedius vessel.  2.  Normal left ventricular ejection fraction 67% with anterior wall akinesis.    Assessment:     1. Coronary artery disease, occlusive     2. Status post insertion of drug eluting coronary artery stent     3. Essential hypertension     4. Mixed hyperlipidemia       Medical Decision Making:  Today's Assessment / Status / Plan:     Assessment  1.  CAD, clinically stable  2.  Diagonal stent 2.75 x 15 mm GAL.  12/12/2018.  3.  Hypertension.  Controlled.  4.  Hyperlipidemia.  On Crestor.    Recommendation and Discussion  1.  The patient is stable from a cardiac standpoint.  2.  Reviewed recent lipid panel.  3.  Discontinue Zetia.  4.  Discontinue Plavix.  5.  Continue aspirin, metoprolol and Crestor.  6.  Lipid panel 6 weeks.  7.  Follow-up 6 months.

## 2020-03-29 DIAGNOSIS — I25.110 CORONARY ARTERY DISEASE INVOLVING NATIVE CORONARY ARTERY OF NATIVE HEART WITH UNSTABLE ANGINA PECTORIS (HCC): ICD-10-CM

## 2020-03-29 DIAGNOSIS — E78.2 MIXED HYPERLIPIDEMIA: ICD-10-CM

## 2020-03-29 DIAGNOSIS — Z95.5 STATUS POST INSERTION OF DRUG ELUTING CORONARY ARTERY STENT: ICD-10-CM

## 2020-03-31 RX ORDER — ASPIRIN 81 MG/1
TABLET ORAL
Qty: 90 TAB | Refills: 3 | Status: SHIPPED | OUTPATIENT
Start: 2020-03-31 | End: 2020-06-02 | Stop reason: SDUPTHER

## 2020-03-31 RX ORDER — EZETIMIBE 10 MG/1
TABLET ORAL
Qty: 90 TAB | Refills: 3 | Status: SHIPPED | OUTPATIENT
Start: 2020-03-31 | End: 2020-05-27

## 2020-05-27 ENCOUNTER — TELEMEDICINE (OUTPATIENT)
Dept: MEDICAL GROUP | Facility: MEDICAL CENTER | Age: 45
End: 2020-05-27
Payer: COMMERCIAL

## 2020-05-27 VITALS
SYSTOLIC BLOOD PRESSURE: 133 MMHG | DIASTOLIC BLOOD PRESSURE: 85 MMHG | BODY MASS INDEX: 32.8 KG/M2 | HEIGHT: 67 IN | WEIGHT: 209 LBS | HEART RATE: 60 BPM

## 2020-05-27 DIAGNOSIS — K21.9 GASTROESOPHAGEAL REFLUX DISEASE, ESOPHAGITIS PRESENCE NOT SPECIFIED: ICD-10-CM

## 2020-05-27 DIAGNOSIS — I10 ESSENTIAL HYPERTENSION: ICD-10-CM

## 2020-05-27 DIAGNOSIS — E78.2 MIXED HYPERLIPIDEMIA: ICD-10-CM

## 2020-05-27 DIAGNOSIS — R73.01 IMPAIRED FASTING GLUCOSE: ICD-10-CM

## 2020-05-27 DIAGNOSIS — I25.110 CORONARY ARTERY DISEASE INVOLVING NATIVE CORONARY ARTERY OF NATIVE HEART WITH UNSTABLE ANGINA PECTORIS (HCC): ICD-10-CM

## 2020-05-27 PROBLEM — I25.10 CORONARY ARTERY DISEASE, OCCLUSIVE: Status: RESOLVED | Noted: 2019-04-08 | Resolved: 2020-05-27

## 2020-05-27 PROCEDURE — 99213 OFFICE O/P EST LOW 20 MIN: CPT | Mod: 95,CR | Performed by: NURSE PRACTITIONER

## 2020-05-27 RX ORDER — PANTOPRAZOLE SODIUM 40 MG/1
40 TABLET, DELAYED RELEASE ORAL DAILY
Qty: 90 TAB | Refills: 3 | Status: SHIPPED | OUTPATIENT
Start: 2020-05-27 | End: 2021-05-26 | Stop reason: SDUPTHER

## 2020-05-27 ASSESSMENT — FIBROSIS 4 INDEX: FIB4 SCORE: 1.23

## 2020-05-27 NOTE — PROGRESS NOTES
This encounter was conducted via Zoom meeting  Verbal consent was obtained. Patient's identity was verified.       CC: Patient is here today for follow-up on acid reflux, impaired fasting glucose and hypertension.  He had not been seen at the office for over a year and was advised he needed a visit for further refills.                                                                                                                                     HPI:   Jose Juan presents today with the following.    1. Gastroesophageal reflux disease, esophagitis presence not specified  Patient gives history of having used omeprazole for 10 to 15 years and then switched over to pantoprazole a year ago.  He states if he does not take the pill in the morning, he will have acid reflux symptoms by the afternoon.  He was referred to GI last year because of the length of time he has been on the medicines but did not make an appointment.    2. Coronary artery disease involving native coronary artery of native heart with unstable angina pectoris (HCC)  Patient has been seen by his cardiologist in the last year and appears to be stable cardiac wise and was taken off his Plavix and placed on baby aspirin.  His Zetia was also stopped and he was put on Crestor.  He has lab work pending before his cardiology visit and he reports no chest pain    3. Impaired fasting glucose  Previous blood sugars have shown mild elevation in his last hemoglobin A1c from 2018 was mildly elevated at 5.9.  He denies diabetic symptoms.    4. Mixed hyperlipidemia  Patient is now on Crestor and is tolerating it well and will do lab work for cardiology before his next visit    5. Essential hypertension  Blood pressure reported as below 140/80 on his beta-blocker.      Patient Active Problem List    Diagnosis Date Noted   • Coronary artery disease involving native coronary artery of native heart with unstable angina pectoris (HCC) 12/12/2018     Priority: High   • NSTEMI  "(non-ST elevated myocardial infarction) ([facility]) 12/11/2018     Priority: High   • Essential hypertension 12/11/2018     Priority: Medium   • Mixed hyperlipidemia 12/11/2018     Priority: Medium   • Obesity 12/11/2018     Priority: Low   • Impaired fasting glucose 02/01/2019   • Gastroesophageal reflux disease 12/26/2018   • Status post insertion of drug eluting coronary artery stent 12/12/2018       Current Outpatient Medications   Medication Sig Dispense Refill   • pantoprazole (PROTONIX) 40 MG Tablet Delayed Response Take 1 Tab by mouth every day. 90 Tab 3   • metoprolol (LOPRESSOR) 25 MG Tab TAKE 1 TABLET BY MOUTH TWICE A  Tab 3   • aspirin 81 MG EC tablet TAKE 1 TABLET BY MOUTH EVERY DAY 90 Tab 3   • rosuvastatin (CRESTOR) 40 MG tablet TAKE 1 TABLET BY MOUTH EVERY DAY IN THE EVENING 90 Tab 0     No current facility-administered medications for this visit.          Allergies as of 05/27/2020   • (No Known Allergies)        ROS:  Denies, chest pain, Shortness of breath, Edema, polyuria or polydipsia.  He denies any physical problems.    /85   Pulse 60   Ht 1.702 m (5' 7\")   Wt 94.8 kg (209 lb)   BMI 32.73 kg/m²       Physical Exam:  Constitutional: Alert, no distress, well-groomed.  Skin: No rashes in visible areas.  Eye: Round. Conjunctiva clear, lNo icterus.   ENMT: Lips pink without lesions, good dentition, moist mucous membranes. Phonation normal.  Neck: No masses, no thyromegaly. Moves freely without pain.  CV: Pulse as reported by patient  Respiratory: Unlabored respiratory effort, no cough or audible wheeze  Psych: Alert and oriented x3, normal affect and mood.          Assessment and Plan.   44 y.o. male with the following issues.    1. Gastroesophageal reflux disease, esophagitis presence not specified  I again will try to get patient into gastroenterology because of the length of time he has been on a PPI and to see whether an EGD is needed to check for Lujan's esophagus.  - pantoprazole " (PROTONIX) 40 MG Tablet Delayed Response; Take 1 Tab by mouth every day.  Dispense: 90 Tab; Refill: 3  - REFERRAL TO GASTROENTEROLOGY    2. Coronary artery disease involving native coronary artery of native heart with unstable angina pectoris (HCC)  Patient appears to be doing well cardiac wise and his medication list is updated and he will continue on same medicines, do lab work and then follow-up with cardiology.    3. Impaired fasting glucose  I suspect patient is still prediabetic with recent blood sugar 103 fasting.  I reminded him he needs to lose weight and follow a low-carb diet  - HEMOGLOBIN A1C; Future    4. Mixed hyperlipidemia  Patient currently on Crestor and will do a lipid panel in the next 6 months    5. Essential hypertension  Patient will continue on his beta-blocker.  I did remind him that he does need to be seen at least yearly for me to continue to prescribe his PPI.

## 2020-06-02 DIAGNOSIS — Z95.5 STATUS POST INSERTION OF DRUG ELUTING CORONARY ARTERY STENT: ICD-10-CM

## 2020-06-02 DIAGNOSIS — E78.2 MIXED HYPERLIPIDEMIA: ICD-10-CM

## 2020-06-03 RX ORDER — ASPIRIN 81 MG/1
81 TABLET ORAL
Qty: 90 TAB | Refills: 3 | Status: SHIPPED | OUTPATIENT
Start: 2020-06-03 | End: 2023-10-25 | Stop reason: SDUPTHER

## 2020-06-03 RX ORDER — ROSUVASTATIN CALCIUM 40 MG/1
40 TABLET, COATED ORAL
Qty: 90 TAB | Refills: 3 | Status: SHIPPED | OUTPATIENT
Start: 2020-06-03 | End: 2020-07-07 | Stop reason: SDUPTHER

## 2020-07-07 DIAGNOSIS — E78.2 MIXED HYPERLIPIDEMIA: ICD-10-CM

## 2020-07-14 RX ORDER — ROSUVASTATIN CALCIUM 40 MG/1
40 TABLET, COATED ORAL
Qty: 90 TAB | Refills: 2 | Status: SHIPPED | OUTPATIENT
Start: 2020-07-14 | End: 2021-07-09

## 2020-07-29 ENCOUNTER — HOSPITAL ENCOUNTER (OUTPATIENT)
Dept: LAB | Facility: MEDICAL CENTER | Age: 45
End: 2020-07-29
Attending: NURSE PRACTITIONER
Payer: COMMERCIAL

## 2020-07-29 ENCOUNTER — HOSPITAL ENCOUNTER (OUTPATIENT)
Dept: LAB | Facility: MEDICAL CENTER | Age: 45
End: 2020-07-29
Attending: INTERNAL MEDICINE
Payer: COMMERCIAL

## 2020-07-29 DIAGNOSIS — R73.01 IMPAIRED FASTING GLUCOSE: ICD-10-CM

## 2020-07-29 LAB
CHOLEST SERPL-MCNC: 157 MG/DL (ref 100–199)
EST. AVERAGE GLUCOSE BLD GHB EST-MCNC: 123 MG/DL
FASTING STATUS PATIENT QL REPORTED: NORMAL
HBA1C MFR BLD: 5.9 % (ref 0–5.6)
HDLC SERPL-MCNC: 47 MG/DL
LDLC SERPL CALC-MCNC: 46 MG/DL
TRIGL SERPL-MCNC: 320 MG/DL (ref 0–149)

## 2020-07-29 PROCEDURE — 36415 COLL VENOUS BLD VENIPUNCTURE: CPT

## 2020-07-29 PROCEDURE — 83036 HEMOGLOBIN GLYCOSYLATED A1C: CPT

## 2020-07-29 PROCEDURE — 80061 LIPID PANEL: CPT

## 2020-07-30 ENCOUNTER — OFFICE VISIT (OUTPATIENT)
Dept: CARDIOLOGY | Facility: MEDICAL CENTER | Age: 45
End: 2020-07-30
Payer: COMMERCIAL

## 2020-07-30 VITALS
HEIGHT: 67 IN | SYSTOLIC BLOOD PRESSURE: 126 MMHG | WEIGHT: 216.8 LBS | HEART RATE: 70 BPM | BODY MASS INDEX: 34.03 KG/M2 | DIASTOLIC BLOOD PRESSURE: 80 MMHG | OXYGEN SATURATION: 98 %

## 2020-07-30 DIAGNOSIS — Z95.5 STATUS POST INSERTION OF DRUG ELUTING CORONARY ARTERY STENT: ICD-10-CM

## 2020-07-30 DIAGNOSIS — I25.110 CORONARY ARTERY DISEASE INVOLVING NATIVE CORONARY ARTERY OF NATIVE HEART WITH UNSTABLE ANGINA PECTORIS (HCC): ICD-10-CM

## 2020-07-30 DIAGNOSIS — E78.2 MIXED HYPERLIPIDEMIA: ICD-10-CM

## 2020-07-30 DIAGNOSIS — I10 ESSENTIAL HYPERTENSION: ICD-10-CM

## 2020-07-30 DIAGNOSIS — I21.4 NSTEMI (NON-ST ELEVATED MYOCARDIAL INFARCTION) (HCC): ICD-10-CM

## 2020-07-30 PROCEDURE — 99214 OFFICE O/P EST MOD 30 MIN: CPT | Performed by: INTERNAL MEDICINE

## 2020-07-30 ASSESSMENT — ENCOUNTER SYMPTOMS
COUGH: 0
MYALGIAS: 0
PALPITATIONS: 0
SHORTNESS OF BREATH: 0
DIZZINESS: 0
LOSS OF CONSCIOUSNESS: 0

## 2020-07-30 ASSESSMENT — FIBROSIS 4 INDEX: FIB4 SCORE: 1.23

## 2020-07-30 NOTE — PROGRESS NOTES
Chief Complaint   Patient presents with   • Follow-Up     Coronary Artery Disease       Subjective:   Jose Juan Hatch is a 44 y.o. male who presents today for follow-up evaluation of CAD, diagonal stent, hypertension and hyperlipidemia.    Last seen on 2/6/2020    Since 2/6/2020 the patient has had no cardiac problems or symptoms.    Since 4/8/2019 the patient has had no cardiac problems or symptoms.  Compliant with medications.    Since 12/01/2018 the patient has had no cardiac symptoms.  Is not able to enroll in cardiac rehab because of work scheduling conflict.  Compliant tolerating medications.    Past Medical History:   Diagnosis Date   • Acid reflux    • CAD (coronary artery disease) 12/12/2018    stent to 1st diagonal.   • Kidney stone      Past Surgical History:   Procedure Laterality Date   • ZZZ CARDIAC CATH  12/12/2018    Rasheed to 1st diagonal.     Family History   Problem Relation Age of Onset   • Hypertension Mother    • Other Father         prostate CA   • Diabetes Father      Social History     Socioeconomic History   • Marital status:      Spouse name: Not on file   • Number of children: Not on file   • Years of education: Not on file   • Highest education level: Not on file   Occupational History   • Not on file   Social Needs   • Financial resource strain: Not on file   • Food insecurity     Worry: Not on file     Inability: Not on file   • Transportation needs     Medical: Not on file     Non-medical: Not on file   Tobacco Use   • Smoking status: Never Smoker   • Smokeless tobacco: Never Used   Substance and Sexual Activity   • Alcohol use: Yes     Alcohol/week: 4.2 oz     Types: 7 Shots of liquor per week     Comment: occasional 2 drinks   • Drug use: No   • Sexual activity: Yes     Partners: Female   Lifestyle   • Physical activity     Days per week: Not on file     Minutes per session: Not on file   • Stress: Not on file   Relationships   • Social connections     Talks on phone: Not on  "file     Gets together: Not on file     Attends Rastafari service: Not on file     Active member of club or organization: Not on file     Attends meetings of clubs or organizations: Not on file     Relationship status: Not on file   • Intimate partner violence     Fear of current or ex partner: Not on file     Emotionally abused: Not on file     Physically abused: Not on file     Forced sexual activity: Not on file   Other Topics Concern   • Not on file   Social History Narrative   • Not on file     No Known Allergies  Outpatient Encounter Medications as of 7/30/2020   Medication Sig Dispense Refill   • rosuvastatin (CRESTOR) 40 MG tablet Take 1 Tab by mouth every day. 90 Tab 2   • aspirin 81 MG EC tablet Take 1 Tab by mouth every day. 90 Tab 3   • pantoprazole (PROTONIX) 40 MG Tablet Delayed Response Take 1 Tab by mouth every day. 90 Tab 3   • metoprolol (LOPRESSOR) 25 MG Tab TAKE 1 TABLET BY MOUTH TWICE A  Tab 3     No facility-administered encounter medications on file as of 7/30/2020.      Review of Systems   Respiratory: Negative for cough and shortness of breath.    Cardiovascular: Negative for chest pain and palpitations.   Musculoskeletal: Negative for myalgias.   Neurological: Negative for dizziness and loss of consciousness.        Objective:   /80 (BP Location: Left arm, Patient Position: Sitting, BP Cuff Size: Adult)   Pulse 70   Ht 1.702 m (5' 7\")   Wt 98.3 kg (216 lb 12.8 oz)   SpO2 98%   BMI 33.96 kg/m²     Physical Exam   Constitutional: He is oriented to person, place, and time. He appears well-developed and well-nourished.   Neck: No JVD present.   Cardiovascular: Normal rate, regular rhythm, normal heart sounds and intact distal pulses.   Pulmonary/Chest: Effort normal and breath sounds normal. No accessory muscle usage. No respiratory distress. He has no wheezes. He has no rales.   Musculoskeletal:         General: No edema.   Neurological: He is alert and oriented to person, " place, and time.   Skin: Skin is warm, dry and intact. No rash noted. No cyanosis. Nails show no clubbing.   Psychiatric: He has a normal mood and affect. His behavior is normal.     CARDIAC CATHETERIZATION: 12/12/2018  A.  Left heart catheterization.  B.  Left ventriculography.  C.  Selective coronary angiography.  D.  Coronary stent implantation, proximal medial branch of a large   trifurcating diagonal vessel, 2.75x15 mm Xience drug-eluting stent.    Assessment:     1. Coronary artery disease involving native coronary artery of native heart with unstable angina pectoris ([facility])     2. NSTEMI (non-ST elevated myocardial infarction) ([facility])     3. Status post insertion of drug eluting coronary artery stent     4. Essential hypertension     5. Mixed hyperlipidemia       Medical Decision Making:  Today's Assessment / Status / Plan:     Assessment  1.  CAD, clinically stable  2.  Diagonal stent 2.75 x 15 mm GAL.  12/12/2018.  3.  N STEMI 2018.  4.  Hypertension.     5.  Hyperlipidemia.  On Crestor.    Recommendation and Discussion  1.  The patient is stable from a cardiac standpoint for his CAD, hypertension and dyslipidemia.  2.  Reviewed recent lipid panel, triglycerides elevated related to an element of dietary indiscretion but remainder of lipid panel at goal.  3.  BP normal.  4.  Continue current cardiac therapy.  5.  RTC 1 year.

## 2020-12-02 ENCOUNTER — OFFICE VISIT (OUTPATIENT)
Dept: URGENT CARE | Facility: PHYSICIAN GROUP | Age: 45
End: 2020-12-02
Payer: COMMERCIAL

## 2020-12-02 ENCOUNTER — HOSPITAL ENCOUNTER (OUTPATIENT)
Dept: LAB | Facility: MEDICAL CENTER | Age: 45
End: 2020-12-02
Attending: PHYSICIAN ASSISTANT
Payer: COMMERCIAL

## 2020-12-02 VITALS
DIASTOLIC BLOOD PRESSURE: 82 MMHG | BODY MASS INDEX: 32.18 KG/M2 | WEIGHT: 205 LBS | HEIGHT: 67 IN | OXYGEN SATURATION: 98 % | RESPIRATION RATE: 20 BRPM | HEART RATE: 86 BPM | SYSTOLIC BLOOD PRESSURE: 128 MMHG | TEMPERATURE: 98.9 F

## 2020-12-02 DIAGNOSIS — M79.10 MYALGIA: ICD-10-CM

## 2020-12-02 DIAGNOSIS — Z20.822 SUSPECTED COVID-19 VIRUS INFECTION: ICD-10-CM

## 2020-12-02 DIAGNOSIS — R53.83 FATIGUE, UNSPECIFIED TYPE: ICD-10-CM

## 2020-12-02 DIAGNOSIS — Z20.822 EXPOSURE TO COVID-19 VIRUS: ICD-10-CM

## 2020-12-02 LAB — COVID ORDER STATUS COVID19: NORMAL

## 2020-12-02 PROCEDURE — 99214 OFFICE O/P EST MOD 30 MIN: CPT | Mod: CS | Performed by: PHYSICIAN ASSISTANT

## 2020-12-02 PROCEDURE — U0003 INFECTIOUS AGENT DETECTION BY NUCLEIC ACID (DNA OR RNA); SEVERE ACUTE RESPIRATORY SYNDROME CORONAVIRUS 2 (SARS-COV-2) (CORONAVIRUS DISEASE [COVID-19]), AMPLIFIED PROBE TECHNIQUE, MAKING USE OF HIGH THROUGHPUT TECHNOLOGIES AS DESCRIBED BY CMS-2020-01-R: HCPCS

## 2020-12-02 ASSESSMENT — ENCOUNTER SYMPTOMS
ABDOMINAL PAIN: 0
FATIGUE: 1
VOMITING: 0
MYALGIAS: 1
NAUSEA: 1
DIARRHEA: 1

## 2020-12-02 ASSESSMENT — FIBROSIS 4 INDEX: FIB4 SCORE: 1.26

## 2020-12-02 NOTE — LETTER
December 2, 2020         Patient: Jose Juan Hatch   YOB: 1975   Date of Visit: 12/2/2020    Your employee was seen in our clinic today.  A concern for COVID-19 has been identified and testing is in progress. We are asking you to excuse absences while following self-isolation protocol per Center for Disease Control (CDC) guidelines.  Your employee will be able to access test results through our electronic delivery system called [employer].     If the results of testing are positive, your employee should follow the CDC guidelines.  These are isolation for a minimum of 10 days and at least 24 hours have passed since your last fever without the use of fever-reducing medications and all other symptoms have improved.  The health department may contact you and provide further directions regarding self-isolation and return to work.     Negative result without close contact*:  If your employee was tested due to their symptoms without close contact to someone with COVID-19 and their test is negative: your employee should not return to work or regular activities until 24 hours after symptoms fully improve. (For example, if patient feels back to normal on Tuesday, should remain isolated through Wednesday).      Negative with close contact*:  If your employee was tested due to close contact to someone, they should self isolate for 14 days after their exposure.    Negative with positive household member  If your employee was due to a positive household member they should still quarantine for a period of 14 days.  The 14 day period begins once the household member is isolated. If unable to quarantine (for example mom from infant), the CDC advises an additional 14-day quarantine period from the COVID-19 household member.   In general, repeat testing is not necessary and not offered through our [facility].     This is the only note that will be provided from [facility] for this visit.  Your employee will  require an appointment with a primary care provider if FMLA or disability forms are required.  If you have any questions please do not hesitate to call me at the phone number listed below.    Sincerely,    ELOY Estrada.-C.  [phone]

## 2020-12-02 NOTE — PATIENT INSTRUCTIONS
INSTRUCTIONS FOR COVID-19 OR ANY OTHER INFECTIOUS RESPIRATORY ILLNESSES    The Centers for Disease Control and Prevention (CDC) states that early indications for COVID-19 include cough, shortness of breath, difficulty breathing, or at least two of the following symptoms: chills, shaking with chills, muscle pain, headache, sore throat, and loss of taste or smell. Symptoms can range from mild to severe and may appear up to two weeks after exposure to the virus.    The practice of self-isolation and quarantine helps protect the public and your family by  preventing exposure to people who have or may have a contagious disease. Please follow the prevention steps below as based on CDC guidelines:    WHEN TO STOP ISOLATION: Persons with COVID-19 or any other infectious respiratory illness who have symptoms and were advised to care for themselves at home may discontinue home isolation under the following conditions:  · At least 24 hours have passed since recovery defined as resolution of fever without the use of fever-reducing medications; AND,  · Improvement in respiratory symptoms (e.g., cough, shortness of breath); AND,  · At least 10 days have passed since symptoms first appeared and have had no subsequent illness.    MONITOR YOUR SYMPTOMS: If your illness is worsening, seek prompt medical attention. If you have a medical emergency and need to call 911, notify the dispatch personnel that you have, or are being evaluated for confirmed or suspected COVID-19 or another infectious respiratory illness. Wear a facemask if possible.    ACTIVITY RESTRICTION: restrict activities outside your home, except for getting medical care. Do not go to work, school, or public areas. Avoid using public transportation, ride-sharing, or taxis.    SCHEDULED MEDICAL APPOINTMENTS: Notify your provider that you have, or are being evaluated for, confirmed or suspected COVID-19 or another infectious respiratory. This will help the healthcare  provider’s office safely take care of you and keep other people from getting exposed or infected.    FACEMASKS, when to wear: Anytime you are away from your home or around other people or pets. If you are unable to wear one, maintain a minimum of 6 feet distancing from others.    LIVING ENVIRONMENT: Stay in a separate room from other people and pets. If possible, use a separate bathroom, have someone else care for your pets and avoid sharing household items. Any items used should be washed thoroughly with soap and water. Clean all “high-touch” surfaces every day. Use a household cleaning spray or wipe, according to the label instructions. High touch surfaces include (but are not limited to) counters, tabletops, doorknobs, bathroom fixtures, toilets, phones, keyboards, tablets, and bedside tables.     HAND WASHING: Frequently wash hands with soap and water for at least 20 seconds,  especially after blowing your nose, coughing, or sneezing; going to the bathroom; before and after interacting with pets; and before and after eating or preparing food. If hands are visibly dirty use soap and water. If soap and water are not available, use an alcohol-based hand  with at least 60% alcohol. Avoid touching your eyes, nose, and mouth with unwashed hands. Cover your coughs and sneezes with a tissue. Throw used tissues in a lined trash can. Immediately wash your hands.    ACTIVE/FACILITATED SELF-MONITORING: Follow instructions provided by your local health department or health professionals, as appropriate. When working with your local health department check their available hours.    [facility]   Phone Number   [facility] [phone]   [facility] [place] [phone]   [place] [address]   [place] [phone]     IF YOU HAVE CONFIRMED POSITIVE COVID-19:    Those who have completely recovered from COVID-19 may have immune-boosting antibodies in their plasma--called “convalescent plasma”--that could be  used to treat critically ill COVID19 patients.    [facility] is excited to begin working with Sarah on collecting convalescent plasma from  people who have recovered from COVID-19 as part of a program to treat patients infected with the virus. This FDA-approved “emergency investigational new drug” is a special blood product containing antibodies that may give patients an extra boost to fight the virus.    To be eligible to donate convalescent plasma, you must have a prior COVID-19 diagnosis documented by a laboratory test (or a positive test result for SARS-CoV-2 antibodies) and meet additional eligibility requirements.    If you are interested in donating convalescent plasma or have any additional questions, please contact the [facility] Convalescent Plasma  at [phone] or via e-mail at [email or web address].

## 2020-12-02 NOTE — PROGRESS NOTES
"Subjective:   Jose Juan Hatch  is a 45 y.o. male who presents for Fatigue (body aches, nausea, diarrhea x 4 dayscovid exposure)    This is a new problem.  Patient presents to urgent care with concern for COVID-19.  Patient reports 6-day history of fatigue, generalized body aches and bilateral feet pain with mild nausea and mild diarrhea.  Patient denies any vomiting.  Denies cough, shortness of breath, congestion, sore throat, loss of taste or loss of smell.  Patient's wife has tested positive for COVID-19.      Fatigue  Associated symptoms include fatigue, myalgias and nausea. Pertinent negatives include no abdominal pain or vomiting.     Review of Systems   Constitutional: Positive for fatigue and malaise/fatigue.   Gastrointestinal: Positive for diarrhea and nausea. Negative for abdominal pain and vomiting.   Musculoskeletal: Positive for myalgias.   All other systems reviewed and are negative.    No Known Allergies  Reviewed past medical, surgical , social and family history.  Reviewed prescription and over-the-counter medications with patient and electronic health record today.     Objective:   /82 (BP Location: Left arm, Patient Position: Sitting, BP Cuff Size: Adult)   Pulse 86   Temp 37.2 °C (98.9 °F) (Temporal)   Resp 20   Ht 1.702 m (5' 7\")   Wt 93 kg (205 lb)   SpO2 98%   BMI 32.11 kg/m²   Physical Exam  Vitals signs reviewed.   Constitutional:       Appearance: He is well-developed. He is not ill-appearing or toxic-appearing.   HENT:      Head: Normocephalic and atraumatic.      Right Ear: Tympanic membrane, ear canal and external ear normal.      Left Ear: Tympanic membrane, ear canal and external ear normal.      Nose: Nose normal.      Mouth/Throat:      Lips: Pink.      Mouth: Mucous membranes are moist.      Pharynx: Uvula midline. No oropharyngeal exudate.   Eyes:      Conjunctiva/sclera: Conjunctivae normal.      Pupils: Pupils are equal, round, and reactive to light.   Neck:      " Musculoskeletal: Normal range of motion and neck supple.   Cardiovascular:      Rate and Rhythm: Normal rate and regular rhythm.      Heart sounds: Normal heart sounds. No murmur. No friction rub.   Pulmonary:      Effort: Pulmonary effort is normal. No respiratory distress.      Breath sounds: Normal breath sounds.   Abdominal:      General: Bowel sounds are normal.      Palpations: Abdomen is soft.      Tenderness: There is no abdominal tenderness.   Musculoskeletal: Normal range of motion.   Lymphadenopathy:      Head:      Right side of head: No submental, submandibular or tonsillar adenopathy.      Left side of head: No submental, submandibular or tonsillar adenopathy.      Cervical: No cervical adenopathy.      Upper Body:      Right upper body: No supraclavicular adenopathy.      Left upper body: No supraclavicular adenopathy.   Skin:     General: Skin is warm and dry.      Findings: No rash.   Neurological:      Mental Status: He is alert and oriented to person, place, and time.      Cranial Nerves: Cranial nerves are intact. No cranial nerve deficit.      Sensory: Sensation is intact. No sensory deficit.      Motor: Motor function is intact.      Coordination: Coordination is intact. Coordination normal.      Gait: Gait is intact.   Psychiatric:         Attention and Perception: Attention normal.         Mood and Affect: Mood normal.         Speech: Speech normal.         Behavior: Behavior normal.         Thought Content: Thought content normal.         Judgment: Judgment normal.           Assessment/Plan:   1. Fatigue, unspecified type  - COVID/SARS COV-2 PCR; Future    2. Myalgia  - COVID/SARS COV-2 PCR; Future    3. Suspected COVID-19 virus infection  - COVID/SARS COV-2 PCR; Future    4. Exposure to COVID-19 virus  - COVID/SARS COV-2 PCR; Future    Testing performed for COVID-19.  Patient/guardian is given printed /MyChart instructions regarding self-isolation.  Work/school note is provided with  specific return to work/school protocols.  Reviewed with patient/guardian that if they do test positive they will be contacted by their local health department regarding return to work/school protocols.  Results will also be [facility] to [facility]guardian [facility] or called to the patient/guardian directly.  Encouraged mask use, frequent handwashing, wiping down hard surfaces, etc.    May use Tylenol or ibuprofen over-the-counter as needed for pain or fever not to exceed recommended daily dose.    Symptomatic and supportive care. Increase fluids, rest.    Upon entering exam room I ensured patient was wearing a mask.  This provider wore appropriate PPE throughout entire visit.  Patient wore mask entire visit except for a brief period while examining oropharynx.    Differential diagnosis, natural history, supportive care, and indications for immediate follow-up discussed.     Red flag warning symptoms and strict ER/follow-up precautions given.  The patient demonstrated a good understanding and agreed with the treatment plan.  Please note that this note was created using voice recognition speech to text software. Every effort has been made to correct obvious errors.  However, I expect there are errors of grammar and possibly context that were not discovered prior to finalizing the note  KY Lima PA-C

## 2020-12-03 LAB
SARS-COV-2 RNA RESP QL NAA+PROBE: DETECTED
SPECIMEN SOURCE: ABNORMAL

## 2021-02-09 DIAGNOSIS — R14.0 BLOATING: ICD-10-CM

## 2021-02-09 DIAGNOSIS — K21.9 GASTROESOPHAGEAL REFLUX DISEASE, UNSPECIFIED WHETHER ESOPHAGITIS PRESENT: ICD-10-CM

## 2021-02-15 ENCOUNTER — OFFICE VISIT (OUTPATIENT)
Dept: URGENT CARE | Facility: PHYSICIAN GROUP | Age: 46
End: 2021-02-15
Payer: COMMERCIAL

## 2021-02-15 VITALS
RESPIRATION RATE: 16 BRPM | HEIGHT: 67 IN | TEMPERATURE: 98.3 F | HEART RATE: 78 BPM | OXYGEN SATURATION: 99 % | BODY MASS INDEX: 34.21 KG/M2 | WEIGHT: 218 LBS | DIASTOLIC BLOOD PRESSURE: 96 MMHG | SYSTOLIC BLOOD PRESSURE: 142 MMHG

## 2021-02-15 DIAGNOSIS — S46.912A STRAIN OF LEFT SHOULDER, INITIAL ENCOUNTER: ICD-10-CM

## 2021-02-15 DIAGNOSIS — M75.52 ACUTE BURSITIS OF LEFT SHOULDER: Primary | ICD-10-CM

## 2021-02-15 PROCEDURE — 99213 OFFICE O/P EST LOW 20 MIN: CPT | Performed by: PHYSICIAN ASSISTANT

## 2021-02-15 RX ORDER — IBUPROFEN 600 MG/1
600 TABLET ORAL EVERY 8 HOURS PRN
Qty: 21 TABLET | Refills: 0 | Status: SHIPPED | OUTPATIENT
Start: 2021-02-15 | End: 2021-02-22

## 2021-02-15 ASSESSMENT — ENCOUNTER SYMPTOMS
COUGH: 0
ABDOMINAL PAIN: 0
NUMBNESS: 0
CHILLS: 0
NAUSEA: 0
DIARRHEA: 0
SHORTNESS OF BREATH: 0
JOINT SWELLING: 0
CONSTIPATION: 0
VOMITING: 0
FEVER: 0
WEAKNESS: 0

## 2021-02-15 NOTE — PROGRESS NOTES
Subjective:   Jose Juan Hatch is a 45 y.o. male who presents for Shoulder Pain (L shoulder pain since saturday)        Shoulder Pain  This is a new problem. Episode onset: 2 days  Pertinent negatives include no abdominal pain, chest pain, chills, coughing, fever, joint swelling, nausea, numbness, rash, vomiting or weakness. Treatments tried: advil 600 mg QD         The pt notes lifting weights again after taking some time off two days prior to pain starting. No known injury. He is having pain with movement, palpation. No redness, warmth. No fever or chills. He does have a hx of rotator cuff tear on R shoulder.       Review of Systems   Constitutional: Negative for chills, fever and malaise/fatigue.   Respiratory: Negative for cough and shortness of breath.    Cardiovascular: Negative for chest pain.   Gastrointestinal: Negative for abdominal pain, constipation, diarrhea, nausea and vomiting.   Musculoskeletal: Negative for joint swelling.   Skin: Negative for rash.   Neurological: Negative for weakness and numbness.   All other systems reviewed and are negative.      PMH:  has a past medical history of Acid reflux, CAD (coronary artery disease) (12/12/2018), and Kidney stone.  MEDS:   Current Outpatient Medications:   •  ibuprofen (MOTRIN) 600 MG Tab, Take 1 tablet by mouth every 8 hours as needed for up to 7 days., Disp: 21 tablet, Rfl: 0  •  rosuvastatin (CRESTOR) 40 MG tablet, Take 1 Tab by mouth every day., Disp: 90 Tab, Rfl: 2  •  aspirin 81 MG EC tablet, Take 1 Tab by mouth every day., Disp: 90 Tab, Rfl: 3  •  pantoprazole (PROTONIX) 40 MG Tablet Delayed Response, Take 1 Tab by mouth every day., Disp: 90 Tab, Rfl: 3  •  metoprolol (LOPRESSOR) 25 MG Tab, TAKE 1 TABLET BY MOUTH TWICE A DAY, Disp: 180 Tab, Rfl: 3  ALLERGIES: No Known Allergies  SURGHX:   Past Surgical History:   Procedure Laterality Date   • Z CARDIAC CATH  12/12/2018    Rasheed to 1st diagonal.     SOCHX:  reports that he has never smoked. He has  "never used smokeless tobacco. He reports current alcohol use of about 4.2 oz of alcohol per week. He reports that he does not use drugs.  Family History   Problem Relation Age of Onset   • Hypertension Mother    • Other Father         prostate CA   • Diabetes Father         Objective:   /96   Pulse 78   Temp 36.8 °C (98.3 °F) (Temporal)   Resp 16   Ht 1.702 m (5' 7\")   Wt 98.9 kg (218 lb)   SpO2 99%   BMI 34.14 kg/m²     Physical Exam  Vitals reviewed.   Constitutional:       General: He is not in acute distress.     Appearance: He is well-developed.   HENT:      Head: Normocephalic and atraumatic.      Right Ear: External ear normal.      Left Ear: External ear normal.      Nose: Nose normal.      Mouth/Throat:      Mouth: Mucous membranes are moist.   Eyes:      Conjunctiva/sclera: Conjunctivae normal.      Pupils: Pupils are equal, round, and reactive to light.   Neck:      Trachea: No tracheal deviation.   Cardiovascular:      Rate and Rhythm: Normal rate and regular rhythm.   Pulmonary:      Effort: Pulmonary effort is normal.      Breath sounds: Normal breath sounds.   Musculoskeletal:      Right shoulder: No swelling.      Left shoulder: Tenderness present. No swelling, deformity, effusion, bony tenderness or crepitus. Decreased range of motion. Normal strength.      Cervical back: Normal range of motion and neck supple.      Comments: Unable to perform ROM due to patient's pain.   Skin:     General: Skin is warm and dry.      Capillary Refill: Capillary refill takes less than 2 seconds.   Neurological:      General: No focal deficit present.      Mental Status: He is alert and oriented to person, place, and time.   Psychiatric:         Mood and Affect: Mood normal.         Behavior: Behavior normal.           Assessment/Plan:     1. Acute bursitis of left shoulder  REFERRAL TO SPORTS MEDICINE    ibuprofen (MOTRIN) 600 MG Tab   2. Strain of left shoulder, initial encounter       Rest, ice, " ibuprofen 600 mg 3 times daily.  Referral was placed to follow-up with sports medicine.       If symptoms worsen or persist patient can return to clinic for reevaluation. All side effects of medication discussed including allergic response, GI upset, tendon injury, etc. Patient confirmed understanding of information.    Please note that this dictation was created using voice recognition software. I have made every reasonable attempt to correct obvious errors, but I expect that there are errors of grammar and possibly content that I did not discover before finalizing the note.

## 2021-04-03 DIAGNOSIS — I25.110 CORONARY ARTERY DISEASE INVOLVING NATIVE CORONARY ARTERY OF NATIVE HEART WITH UNSTABLE ANGINA PECTORIS (HCC): ICD-10-CM

## 2021-05-24 DIAGNOSIS — I25.110 CORONARY ARTERY DISEASE INVOLVING NATIVE CORONARY ARTERY OF NATIVE HEART WITH UNSTABLE ANGINA PECTORIS (HCC): ICD-10-CM

## 2021-05-24 NOTE — TELEPHONE ENCOUNTER
Please advise patient, office visit is due prior to further refill. Thanks! Nazanin Salazar P.A.-C. covering for CHEYENNE Agarwal

## 2021-05-26 DIAGNOSIS — K21.9 GASTROESOPHAGEAL REFLUX DISEASE: ICD-10-CM

## 2021-05-26 RX ORDER — PANTOPRAZOLE SODIUM 40 MG/1
40 TABLET, DELAYED RELEASE ORAL DAILY
Qty: 30 TABLET | Refills: 0 | Status: SHIPPED | OUTPATIENT
Start: 2021-05-26 | End: 2023-10-25

## 2021-07-08 DIAGNOSIS — E78.2 MIXED HYPERLIPIDEMIA: ICD-10-CM

## 2021-07-09 ENCOUNTER — TELEPHONE (OUTPATIENT)
Dept: CARDIOLOGY | Facility: MEDICAL CENTER | Age: 46
End: 2021-07-09

## 2021-07-09 NOTE — TELEPHONE ENCOUNTER
Called PT and LM to call back and schedule.   SLC    ----- Message from Karli Howe, Med Ass't sent at 7/9/2021  8:23 AM PDT -----  Regarding: Need Follow Appointment  Please contact patient to schedule follow up appointment. Thank you

## 2021-07-12 ENCOUNTER — TELEPHONE (OUTPATIENT)
Dept: CARDIOLOGY | Facility: MEDICAL CENTER | Age: 46
End: 2021-07-12

## 2021-07-12 RX ORDER — ROSUVASTATIN CALCIUM 40 MG/1
TABLET, COATED ORAL
Qty: 90 TABLET | Refills: 0 | Status: SHIPPED | OUTPATIENT
Start: 2021-07-12 | End: 2021-07-19 | Stop reason: SDUPTHER

## 2021-07-12 NOTE — TELEPHONE ENCOUNTER
Left 2nd message and will send MyChart message to schedule.   SLC    ----- Message from Karli Howe, Med Ass't sent at 7/9/2021  8:23 AM PDT -----  Regarding: Need Follow Appointment  Please contact patient to schedule follow up appointment. Thank you

## 2021-07-15 ENCOUNTER — TELEPHONE (OUTPATIENT)
Dept: CARDIOLOGY | Facility: MEDICAL CENTER | Age: 46
End: 2021-07-15

## 2021-07-15 NOTE — TELEPHONE ENCOUNTER
LVM for pt in regards to lab work that was ordered at previous OV with SW. Pt will have lab work done at a [facility] facility before his appt on 7/19/2021. Pt was advised of lab work to be fasting.

## 2021-07-16 ENCOUNTER — HOSPITAL ENCOUNTER (OUTPATIENT)
Dept: LAB | Facility: MEDICAL CENTER | Age: 46
End: 2021-07-16
Attending: INTERNAL MEDICINE
Payer: COMMERCIAL

## 2021-07-16 DIAGNOSIS — E78.2 MIXED HYPERLIPIDEMIA: ICD-10-CM

## 2021-07-16 LAB
ALBUMIN SERPL BCP-MCNC: 4.7 G/DL (ref 3.2–4.9)
ALBUMIN/GLOB SERPL: 1.4 G/DL
ALP SERPL-CCNC: 79 U/L (ref 30–99)
ALT SERPL-CCNC: 39 U/L (ref 2–50)
ANION GAP SERPL CALC-SCNC: 10 MMOL/L (ref 7–16)
AST SERPL-CCNC: 41 U/L (ref 12–45)
BILIRUB SERPL-MCNC: 0.6 MG/DL (ref 0.1–1.5)
BUN SERPL-MCNC: 17 MG/DL (ref 8–22)
CALCIUM SERPL-MCNC: 9.4 MG/DL (ref 8.5–10.5)
CHLORIDE SERPL-SCNC: 105 MMOL/L (ref 96–112)
CHOLEST SERPL-MCNC: 210 MG/DL (ref 100–199)
CO2 SERPL-SCNC: 25 MMOL/L (ref 20–33)
CREAT SERPL-MCNC: 1.02 MG/DL (ref 0.5–1.4)
FASTING STATUS PATIENT QL REPORTED: NORMAL
GLOBULIN SER CALC-MCNC: 3.4 G/DL (ref 1.9–3.5)
GLUCOSE SERPL-MCNC: 103 MG/DL (ref 65–99)
HDLC SERPL-MCNC: 54 MG/DL
LDLC SERPL CALC-MCNC: 122 MG/DL
POTASSIUM SERPL-SCNC: 4.2 MMOL/L (ref 3.6–5.5)
PROT SERPL-MCNC: 8.1 G/DL (ref 6–8.2)
SODIUM SERPL-SCNC: 140 MMOL/L (ref 135–145)
TRIGL SERPL-MCNC: 170 MG/DL (ref 0–149)

## 2021-07-16 PROCEDURE — 80053 COMPREHEN METABOLIC PANEL: CPT

## 2021-07-16 PROCEDURE — 36415 COLL VENOUS BLD VENIPUNCTURE: CPT

## 2021-07-16 PROCEDURE — 80061 LIPID PANEL: CPT

## 2021-07-19 ENCOUNTER — OFFICE VISIT (OUTPATIENT)
Dept: CARDIOLOGY | Facility: MEDICAL CENTER | Age: 46
End: 2021-07-19
Payer: COMMERCIAL

## 2021-07-19 VITALS
SYSTOLIC BLOOD PRESSURE: 142 MMHG | RESPIRATION RATE: 14 BRPM | WEIGHT: 218 LBS | OXYGEN SATURATION: 97 % | HEIGHT: 67 IN | BODY MASS INDEX: 34.21 KG/M2 | HEART RATE: 64 BPM | DIASTOLIC BLOOD PRESSURE: 88 MMHG

## 2021-07-19 DIAGNOSIS — E78.2 MIXED HYPERLIPIDEMIA: ICD-10-CM

## 2021-07-19 DIAGNOSIS — I10 ESSENTIAL HYPERTENSION: ICD-10-CM

## 2021-07-19 DIAGNOSIS — I25.110 CORONARY ARTERY DISEASE INVOLVING NATIVE CORONARY ARTERY OF NATIVE HEART WITH UNSTABLE ANGINA PECTORIS (HCC): ICD-10-CM

## 2021-07-19 DIAGNOSIS — Z95.5 STATUS POST INSERTION OF DRUG ELUTING CORONARY ARTERY STENT: ICD-10-CM

## 2021-07-19 PROCEDURE — 99214 OFFICE O/P EST MOD 30 MIN: CPT | Performed by: NURSE PRACTITIONER

## 2021-07-19 RX ORDER — ROSUVASTATIN CALCIUM 40 MG/1
40 TABLET, COATED ORAL
Qty: 90 TABLET | Refills: 3 | Status: SHIPPED | OUTPATIENT
Start: 2021-07-19 | End: 2021-10-12 | Stop reason: SDUPTHER

## 2021-07-19 ASSESSMENT — ENCOUNTER SYMPTOMS
DIZZINESS: 0
HEMOPTYSIS: 0
WHEEZING: 0
PND: 0
PALPITATIONS: 0
COUGH: 0
FEVER: 0
SHORTNESS OF BREATH: 0
SPUTUM PRODUCTION: 0
CLAUDICATION: 0
VOMITING: 0
CHILLS: 0
ORTHOPNEA: 0
NAUSEA: 0
HEADACHES: 0

## 2021-07-19 NOTE — PROGRESS NOTES
Chief Complaint   Patient presents with   • MI (Non ST Segment Elevation MI)   • Coronary Artery Disease     F/V Dx: Coronary artery disease involving native coronary artery of native heart with unstable angina pectoris (HCC)   • Hypertension     F/V Dx: Essential hypertension       Subjective:   Jose Juan Hatch is a 45 y.o. male who presents today for CAD, diagonal stent, hypertension and hyperlipidemia.  Patient was last seen 7/30/2020 by Dr. Valles.    Since 7/30/2020, the patient was diagnosed with Covid December 2020.  He has had no leftover symptoms.  He denies chest pain, palpitations, orthopnea, shortness of breath, and PND.  Lab work shows normal electrolytes, kidney function, liver function.  Unfortunately he is been eating a lot of fried foods and it is evident by his total cholesterol increasing to 210, triglycerides 170, HDL 54, and .  Blood pressure is a bit high today, however the patient reports that his SBP is normally under 130.     Last seen on 2/6/2020     Since 2/6/2020 the patient has had no cardiac problems or symptoms.     Since 4/8/2019 the patient has had no cardiac problems or symptoms.  Compliant with medications.     Since 12/01/2018 the patient has had no cardiac symptoms.  Is not able to enroll in cardiac rehab because of work scheduling conflict.  Compliant tolerating medications.    Past Medical History:   Diagnosis Date   • Acid reflux    • CAD (coronary artery disease) 12/12/2018    stent to 1st diagonal.   • Kidney stone      Past Surgical History:   Procedure Laterality Date   • ZZZ CARDIAC CATH  12/12/2018    Rasheed to 1st diagonal.     Family History   Problem Relation Age of Onset   • Hypertension Mother    • Other Father         prostate CA   • Diabetes Father      Social History     Socioeconomic History   • Marital status:      Spouse name: Not on file   • Number of children: Not on file   • Years of education: Not on file   • Highest education level:  Not on file   Occupational History   • Not on file   Tobacco Use   • Smoking status: Never Smoker   • Smokeless tobacco: Never Used   Vaping Use   • Vaping Use: Never used   Substance and Sexual Activity   • Alcohol use: Yes     Alcohol/week: 4.2 oz     Types: 7 Shots of liquor per week     Comment: occasional 2 drinks   • Drug use: No   • Sexual activity: Yes     Partners: Female   Other Topics Concern   • Not on file   Social History Narrative   • Not on file     Social Determinants of Health     Financial Resource Strain:    • Difficulty of Paying Living Expenses:    Food Insecurity:    • Worried About Running Out of Food in the Last Year:    • Ran Out of Food in the Last Year:    Transportation Needs:    • Lack of Transportation (Medical):    • Lack of Transportation (Non-Medical):    Physical Activity:    • Days of Exercise per Week:    • Minutes of Exercise per Session:    Stress:    • Feeling of Stress :    Social Connections:    • Frequency of Communication with Friends and Family:    • Frequency of Social Gatherings with Friends and Family:    • Attends Sabianism Services:    • Active Member of Clubs or Organizations:    • Attends Club or Organization Meetings:    • Marital Status:    Intimate Partner Violence:    • Fear of Current or Ex-Partner:    • Emotionally Abused:    • Physically Abused:    • Sexually Abused:      No Known Allergies  Outpatient Encounter Medications as of 7/19/2021   Medication Sig Dispense Refill   • metoprolol tartrate (LOPRESSOR) 25 MG Tab Take 1 tablet by mouth 2 times a day. Please call [phone] for a follow up visit for further refills. 180 tablet 3   • rosuvastatin (CRESTOR) 40 MG tablet Take 1 tablet by mouth every day. 90 tablet 3   • pantoprazole (PROTONIX) 40 MG Tablet Delayed Response Take 1 tablet by mouth every day. (Patient taking differently: Take 40 mg by mouth as needed.) 30 tablet 0   • aspirin 81 MG EC tablet Take 1 Tab by mouth every day. 90 Tab 3   •  "[DISCONTINUED] rosuvastatin (CRESTOR) 40 MG tablet TAKE 1 TABLET BY MOUTH EVERY DAY 90 tablet 0   • [DISCONTINUED] metoprolol tartrate (LOPRESSOR) 25 MG Tab Take 1 tablet by mouth 2 times a day. Please call [phone] for a follow up visit for further refills. 180 tablet 0     No facility-administered encounter medications on file as of 7/19/2021.     Review of Systems   Constitutional: Negative for chills and fever.   Respiratory: Negative for cough, hemoptysis, sputum production, shortness of breath and wheezing.    Cardiovascular: Negative for chest pain, palpitations, orthopnea, claudication, leg swelling and PND.   Gastrointestinal: Negative for nausea and vomiting.   Neurological: Negative for dizziness and headaches.   All other systems reviewed and are negative.       Objective:   /88 (BP Location: Left arm, Patient Position: Sitting, BP Cuff Size: Adult)   Pulse 64   Resp 14   Ht 1.702 m (5' 7\")   Wt 98.9 kg (218 lb)   SpO2 97%   BMI 34.14 kg/m²     Physical Exam   Constitutional: He appears well-developed.   Neck: No JVD present.   Cardiovascular: Normal rate, regular rhythm and normal heart sounds.   No murmur heard.  Pulmonary/Chest: Effort normal and breath sounds normal.   Abdominal: Soft.   Musculoskeletal:      Cervical back: Neck supple.   Neurological:   CN II-XII grossly intact   Skin: Skin is warm and dry.   Psychiatric: His behavior is normal. Judgment and thought content normal.   Nursing note and vitals reviewed.    CARDIAC CATHETERIZATION: 12/12/2018  A.  Left heart catheterization.  B.  Left ventriculography.  C.  Selective coronary angiography.  D.  Coronary stent implantation, proximal medial branch of a large   trifurcating diagonal vessel, 2.75x15 mm Xience drug-eluting stent.    Assessment:     1. Coronary artery disease involving native coronary artery of native heart with unstable angina pectoris (HCC)  metoprolol tartrate (LOPRESSOR) 25 MG Tab    Lipid Profile   2. " Mixed hyperlipidemia  rosuvastatin (CRESTOR) 40 MG tablet    Lipid Profile   3. Essential hypertension     4. Status post insertion of drug eluting coronary artery stent  Lipid Profile       Medical Decision Making:  Today's Assessment / Status / Plan:   1.  Continue to monitor blood pressure  2.  Encouraged to minimize dietary indiscretions  3.  Refill metoprolol and rosuvastatin.  Continue ASA 81 mg  4.  Recheck lipid panel in 6 months and follow-up with Dr. Valles

## 2021-10-12 DIAGNOSIS — E78.2 MIXED HYPERLIPIDEMIA: ICD-10-CM

## 2021-10-13 RX ORDER — ROSUVASTATIN CALCIUM 40 MG/1
40 TABLET, COATED ORAL
Qty: 90 TABLET | Refills: 3 | Status: SHIPPED | OUTPATIENT
Start: 2021-10-13 | End: 2022-01-06 | Stop reason: SDUPTHER

## 2022-01-06 DIAGNOSIS — E78.2 MIXED HYPERLIPIDEMIA: ICD-10-CM

## 2022-01-07 RX ORDER — ROSUVASTATIN CALCIUM 40 MG/1
40 TABLET, COATED ORAL
Qty: 90 TABLET | Refills: 3 | Status: SHIPPED | OUTPATIENT
Start: 2022-01-07 | End: 2023-04-28 | Stop reason: SDUPTHER

## 2022-03-06 ENCOUNTER — APPOINTMENT (OUTPATIENT)
Dept: RADIOLOGY | Facility: IMAGING CENTER | Age: 47
End: 2022-03-06
Attending: PHYSICIAN ASSISTANT
Payer: COMMERCIAL

## 2022-03-06 ENCOUNTER — OFFICE VISIT (OUTPATIENT)
Dept: URGENT CARE | Facility: CLINIC | Age: 47
End: 2022-03-06
Payer: COMMERCIAL

## 2022-03-06 VITALS
OXYGEN SATURATION: 98 % | HEIGHT: 67 IN | HEART RATE: 78 BPM | WEIGHT: 215 LBS | DIASTOLIC BLOOD PRESSURE: 84 MMHG | TEMPERATURE: 98 F | RESPIRATION RATE: 16 BRPM | BODY MASS INDEX: 33.74 KG/M2 | SYSTOLIC BLOOD PRESSURE: 128 MMHG

## 2022-03-06 VITALS
BODY MASS INDEX: 33.74 KG/M2 | HEART RATE: 89 BPM | HEIGHT: 67 IN | TEMPERATURE: 97.9 F | DIASTOLIC BLOOD PRESSURE: 80 MMHG | RESPIRATION RATE: 16 BRPM | OXYGEN SATURATION: 97 % | SYSTOLIC BLOOD PRESSURE: 120 MMHG | WEIGHT: 215 LBS

## 2022-03-06 DIAGNOSIS — S29.011A STRAIN OF LEFT PECTORALIS MUSCLE, INITIAL ENCOUNTER: ICD-10-CM

## 2022-03-06 PROCEDURE — 71046 X-RAY EXAM CHEST 2 VIEWS: CPT | Mod: TC | Performed by: NURSE PRACTITIONER

## 2022-03-06 PROCEDURE — 99214 OFFICE O/P EST MOD 30 MIN: CPT | Performed by: PHYSICIAN ASSISTANT

## 2022-03-06 RX ORDER — OMEPRAZOLE 20 MG/1
20 CAPSULE, DELAYED RELEASE ORAL DAILY
COMMUNITY

## 2022-03-06 ASSESSMENT — ENCOUNTER SYMPTOMS: MUSCULOSKELETAL PAIN: 1

## 2022-03-06 NOTE — PROGRESS NOTES
"Subjective:   Jose Juan Hatch is a 46 y.o. male who presents for Chest Swelling (X2 days, sore chest from weight lifting, swollen left side of chest area ) and Error         HPI      ROS    Medications:  aspirin  metoprolol tartrate Tabs  pantoprazole Tbec  rosuvastatin    Allergies:             Patient has no known allergies.    Surgical History:         Past Surgical History:   Procedure Laterality Date   • ZZZ CARDIAC CATH  12/12/2018    Rasheed to 1st diagonal.       Past Social Hx:  Jose Juan Hatch  reports that he has never smoked. He has never used smokeless tobacco. He reports current alcohol use of about 4.2 oz per week. He reports that he does not use drugs.     Past Family Hx:   Jose Juan Hatch family history includes Diabetes in his father; Hypertension in his mother; Other in his father.       Problem list, medications, and allergies reviewed by myself today in Epic.     Objective:     /84   Pulse 78   Temp 36.7 °C (98 °F) (Temporal)   Resp 16   Ht 1.702 m (5' 7\")   Wt 97.5 kg (215 lb)   SpO2 98%   BMI 33.67 kg/m²     Physical Exam    Assessment/Plan:     Diagnosis and Associated Orders:     1. ERRONEOUS ENCOUNTER--DISREGARD      Comments/MDM:        I personally reviewed prior external notes and test results pertinent to today's visit.  Red flags discussed as well as indications to present to the Emergency Department.  Supportive care, natural history, differential diagnoses, and indications for immediate follow-up discussed.  Patient expresses understanding and agrees to plan.  Patient denies any other questions or concerns.    Follow-up with the primary care physician for recheck, reevaluation, and consideration of further management.      Please note that this dictation was created using voice recognition software. I have made a reasonable attempt to correct obvious errors, but I expect that there are errors of grammar and possibly content that I did not discover before finalizing the " note.    This note was electronically signed by Shaista Nassar PA-C        This encounter was created in error - please disregard.  This encounter was created in error - please disregard.

## 2022-03-06 NOTE — PROGRESS NOTES
"Subjective:   Jose Juan Hatch is a 46 y.o. male who presents for Muscle Pain (Left side delgadillo, has bruising on let side/)      Muscle Pain    Patient is a 46-year-old male who presents to clinic with complaints of left pectoralis pain onset last night.  He states he was attempting to max out his bench and while he was benching over 300 lbs, he felt imediate pain to his left pectoralis muscle.  Since then, he continues to have pain to his left pectoralis with significant swelling and there is bruising as well.  The pain is mild.  Pain is worse with pushing movements and overhead reaching.  Denies any weakness.  Denies any numbness, tingling.  Denies any chest pain or shortness of breath.  He has no other complaints or concerns.        Medications:    • aspirin  • metoprolol tartrate Tabs  • omeprazole  • pantoprazole Tbec  • rosuvastatin    Allergies: Patient has no known allergies.    Problem List: Jose Juan Hatch does not have any pertinent problems on file.    Surgical History:  Past Surgical History:   Procedure Laterality Date   • ZZZ CARDIAC CATH  12/12/2018    Rasheed to 1st diagonal.       Past Social Hx: Jose Juan Hatch  reports that he has never smoked. He has never used smokeless tobacco. He reports current alcohol use of about 4.2 oz of alcohol per week. He reports that he does not use drugs.     Past Family Hx:  Jose Juan Hatch family history includes Diabetes in his father; Hypertension in his mother; Other in his father.     Problem list, medications, and allergies reviewed by myself today in Epic.     Objective:     /80   Pulse 89   Temp 36.6 °C (97.9 °F) (Temporal)   Resp 16   Ht 1.702 m (5' 7\")   Wt 97.5 kg (215 lb)   SpO2 97%   BMI 33.67 kg/m²     Physical Exam  Vitals reviewed.   Constitutional:       General: He is not in acute distress.     Appearance: Normal appearance. He is not ill-appearing or toxic-appearing.   Eyes:      Conjunctiva/sclera: Conjunctivae normal.      Pupils: " Pupils are equal, round, and reactive to light.   Cardiovascular:      Rate and Rhythm: Normal rate and regular rhythm.      Heart sounds: Normal heart sounds.   Pulmonary:      Effort: Pulmonary effort is normal. No respiratory distress.      Breath sounds: Normal breath sounds. No wheezing, rhonchi or rales.   Chest:          Comments: There is moderate to severe swelling to the left pectoralis muscle.  There is mild ecchymosis to the superior lateral pectoralis muscle.  Mild tenderness to palpation throughout left pectoralis muscle.   Left upper extremity: 5/5 with flexion and extension at the elbow. Reproduction of tenderness with shoulder flexion.  Biceps tendons appear to be intact.  No jennifer's deformity.  Pulses 2+ distally.   Sensation intact.   Capillary refill < 2 seconds distally.   Musculoskeletal:      Cervical back: Neck supple.   Skin:     General: Skin is warm and dry.   Neurological:      General: No focal deficit present.      Mental Status: He is alert and oriented to person, place, and time.   Psychiatric:         Mood and Affect: Mood normal.           RADIOLOGY RESULTS   DX-CHEST-2 VIEWS    Result Date: 3/6/2022  3/6/2022 2:33 PM HISTORY/REASON FOR EXAM:  pectoralis tear, left chest wall pain TECHNIQUE/EXAM DESCRIPTION AND NUMBER OF VIEWS: Two views of the chest. COMPARISON:  12/18/2018. FINDINGS: No pulmonary infiltrates or consolidations are noted. No pleural effusions, no pneumothorax are appreciated. Normal cardiopericardial silhouette.     1. No active cardiopulmonary abnormalities are identified. 2. MRI would be helpful if there is concern for pectoralis tear.           Diagnosis and associated orders:     1. Strain of left pectoralis muscle, initial encounter  DX-CHEST-2 VIEWS    Referral to Sports Medicine        Comments/MDM:     • I discussed the patient's case and the above findings with Mj Li (Orthopedic Surgery PA) who states this is not emergent and recommended rest,  compression with ace wrap, Ice application, and anti-inflammatories. He recommend referral to a sports medicine specialist for follow up, reevaluation, and consideration of further management. He reports of no concern for compartment syndrome or neurovascular compromise.   • Discussed findings and recommendations to patient. X-ray results per radiologist interpretation above. I personally reviewed images and radiologist report. Area was ace wrapped. Ibuprofen 800 mg every 8 hours for pain. Ice.   • Patient overall is well-appearing in no acute distress.       I personally reviewed prior external notes and test results pertinent to today's visit. Red flags discussed. Supportive care, natural history, differential diagnoses, and indications for immediate follow-up discussed. Patient expresses understanding and agrees to plan. Patient denies any other questions or concerns.     Follow-up with the primary care physician for recheck, reevaluation, and consideration of further management.    Please note that this dictation was created using voice recognition software. I have made a reasonable attempt to correct obvious errors, but I expect that there are errors of grammar and possibly content that I did not discover before finalizing the note.    This note was electronically signed by Emiliano Mckinnon PA-C

## 2022-03-13 SDOH — ECONOMIC STABILITY: FOOD INSECURITY: WITHIN THE PAST 12 MONTHS, THE FOOD YOU BOUGHT JUST DIDN'T LAST AND YOU DIDN'T HAVE MONEY TO GET MORE.: NEVER TRUE

## 2022-03-13 SDOH — ECONOMIC STABILITY: TRANSPORTATION INSECURITY
IN THE PAST 12 MONTHS, HAS LACK OF TRANSPORTATION KEPT YOU FROM MEETINGS, WORK, OR FROM GETTING THINGS NEEDED FOR DAILY LIVING?: NO

## 2022-03-13 SDOH — ECONOMIC STABILITY: TRANSPORTATION INSECURITY
IN THE PAST 12 MONTHS, HAS THE LACK OF TRANSPORTATION KEPT YOU FROM MEDICAL APPOINTMENTS OR FROM GETTING MEDICATIONS?: NO

## 2022-03-13 SDOH — HEALTH STABILITY: PHYSICAL HEALTH: ON AVERAGE, HOW MANY DAYS PER WEEK DO YOU ENGAGE IN MODERATE TO STRENUOUS EXERCISE (LIKE A BRISK WALK)?: 3 DAYS

## 2022-03-13 SDOH — ECONOMIC STABILITY: INCOME INSECURITY: IN THE LAST 12 MONTHS, WAS THERE A TIME WHEN YOU WERE NOT ABLE TO PAY THE MORTGAGE OR RENT ON TIME?: NO

## 2022-03-13 SDOH — HEALTH STABILITY: PHYSICAL HEALTH: ON AVERAGE, HOW MANY MINUTES DO YOU ENGAGE IN EXERCISE AT THIS LEVEL?: 40 MIN

## 2022-03-13 SDOH — ECONOMIC STABILITY: HOUSING INSECURITY
IN THE LAST 12 MONTHS, WAS THERE A TIME WHEN YOU DID NOT HAVE A STEADY PLACE TO SLEEP OR SLEPT IN A SHELTER (INCLUDING NOW)?: NO

## 2022-03-13 SDOH — ECONOMIC STABILITY: TRANSPORTATION INSECURITY
IN THE PAST 12 MONTHS, HAS LACK OF RELIABLE TRANSPORTATION KEPT YOU FROM MEDICAL APPOINTMENTS, MEETINGS, WORK OR FROM GETTING THINGS NEEDED FOR DAILY LIVING?: NO

## 2022-03-13 SDOH — ECONOMIC STABILITY: HOUSING INSECURITY: IN THE LAST 12 MONTHS, HOW MANY PLACES HAVE YOU LIVED?: 1

## 2022-03-13 SDOH — HEALTH STABILITY: MENTAL HEALTH
STRESS IS WHEN SOMEONE FEELS TENSE, NERVOUS, ANXIOUS, OR CAN'T SLEEP AT NIGHT BECAUSE THEIR MIND IS TROUBLED. HOW STRESSED ARE YOU?: NOT AT ALL

## 2022-03-13 SDOH — ECONOMIC STABILITY: INCOME INSECURITY: HOW HARD IS IT FOR YOU TO PAY FOR THE VERY BASICS LIKE FOOD, HOUSING, MEDICAL CARE, AND HEATING?: NOT HARD AT ALL

## 2022-03-13 SDOH — ECONOMIC STABILITY: FOOD INSECURITY: WITHIN THE PAST 12 MONTHS, YOU WORRIED THAT YOUR FOOD WOULD RUN OUT BEFORE YOU GOT MONEY TO BUY MORE.: NEVER TRUE

## 2022-03-13 ASSESSMENT — SOCIAL DETERMINANTS OF HEALTH (SDOH)
HOW OFTEN DO YOU ATTEND CHURCH OR RELIGIOUS SERVICES?: NEVER
HOW OFTEN DO YOU GET TOGETHER WITH FRIENDS OR RELATIVES?: ONCE A WEEK
WITHIN THE PAST 12 MONTHS, YOU WORRIED THAT YOUR FOOD WOULD RUN OUT BEFORE YOU GOT THE MONEY TO BUY MORE: NEVER TRUE
HOW OFTEN DO YOU ATTEND CHURCH OR RELIGIOUS SERVICES?: NEVER
IN A TYPICAL WEEK, HOW MANY TIMES DO YOU TALK ON THE PHONE WITH FAMILY, FRIENDS, OR NEIGHBORS?: MORE THAN THREE TIMES A WEEK
HOW OFTEN DO YOU HAVE A DRINK CONTAINING ALCOHOL: 2-3 TIMES A WEEK
HOW HARD IS IT FOR YOU TO PAY FOR THE VERY BASICS LIKE FOOD, HOUSING, MEDICAL CARE, AND HEATING?: NOT HARD AT ALL
HOW OFTEN DO YOU HAVE SIX OR MORE DRINKS ON ONE OCCASION: NEVER
HOW OFTEN DO YOU GET TOGETHER WITH FRIENDS OR RELATIVES?: ONCE A WEEK
IN A TYPICAL WEEK, HOW MANY TIMES DO YOU TALK ON THE PHONE WITH FAMILY, FRIENDS, OR NEIGHBORS?: MORE THAN THREE TIMES A WEEK
HOW MANY DRINKS CONTAINING ALCOHOL DO YOU HAVE ON A TYPICAL DAY WHEN YOU ARE DRINKING: 1 OR 2
DO YOU BELONG TO ANY CLUBS OR ORGANIZATIONS SUCH AS CHURCH GROUPS UNIONS, FRATERNAL OR ATHLETIC GROUPS, OR SCHOOL GROUPS?: NO
DO YOU BELONG TO ANY CLUBS OR ORGANIZATIONS SUCH AS CHURCH GROUPS UNIONS, FRATERNAL OR ATHLETIC GROUPS, OR SCHOOL GROUPS?: NO

## 2022-03-13 ASSESSMENT — LIFESTYLE VARIABLES
HOW OFTEN DO YOU HAVE SIX OR MORE DRINKS ON ONE OCCASION: NEVER
HOW OFTEN DO YOU HAVE A DRINK CONTAINING ALCOHOL: 2-3 TIMES A WEEK
HOW MANY STANDARD DRINKS CONTAINING ALCOHOL DO YOU HAVE ON A TYPICAL DAY: 1 OR 2

## 2022-03-14 ENCOUNTER — OFFICE VISIT (OUTPATIENT)
Dept: SPORTS MEDICINE | Facility: CLINIC | Age: 47
End: 2022-03-14
Payer: COMMERCIAL

## 2022-03-14 VITALS
TEMPERATURE: 98.4 F | BODY MASS INDEX: 33.74 KG/M2 | OXYGEN SATURATION: 96 % | SYSTOLIC BLOOD PRESSURE: 130 MMHG | HEIGHT: 67 IN | WEIGHT: 215 LBS | DIASTOLIC BLOOD PRESSURE: 88 MMHG | HEART RATE: 80 BPM | RESPIRATION RATE: 18 BRPM

## 2022-03-14 DIAGNOSIS — S29.011A PECTORALIS MUSCLE RUPTURE, INITIAL ENCOUNTER: ICD-10-CM

## 2022-03-14 DIAGNOSIS — S20.20XA TRAUMATIC ECCHYMOSIS OF CHEST, INITIAL ENCOUNTER: ICD-10-CM

## 2022-03-14 PROCEDURE — 99213 OFFICE O/P EST LOW 20 MIN: CPT | Performed by: FAMILY MEDICINE

## 2022-03-14 ASSESSMENT — ENCOUNTER SYMPTOMS
DIZZINESS: 0
CHILLS: 0
VOMITING: 0
SHORTNESS OF BREATH: 0
FEVER: 0
NAUSEA: 0

## 2022-03-14 NOTE — PROGRESS NOTES
Chief Complaint   Patient presents with   • Chest Injury     Referral from / Chest wall injury      Subjective     Referred by Emiliano Mckinnon PA-C for evaluation of LEFT pectoralis injury (RIGHT-handed dominant)  Date of injury, Saturday, March 5, 2022  Mechanism injury, performing a flat bench with 325 pounds and felt sudden crampy pain  Weight needed to be removed by his   After that he experienced discomfort and mild pain  He feels tight with certain movements  And certain movements cause pain, but he is not really having much pain at baseline  Has a prior history of contralateral (right) rotator cuff tear which was managed nonoperatively  Taking ibuprofen, last dose was 2 days ago  Initially he was having difficulty sleeping, but that has improved  He had a chest x-ray done at urgent care    , mostly office work  Likes lifting weights, golfing    Review of Systems   Constitutional: Negative for chills and fever.   Respiratory: Negative for shortness of breath.    Cardiovascular: Negative for chest pain.   Gastrointestinal: Negative for nausea and vomiting.   Neurological: Negative for dizziness.      PMH:  has a past medical history of Acid reflux, CAD (coronary artery disease) (12/12/2018), Heart attack (HCC), and Kidney stone.  MEDS:   Current Outpatient Medications:   •  omeprazole (PRILOSEC) 20 MG delayed-release capsule, Take 20 mg by mouth every day., Disp: , Rfl:   •  rosuvastatin (CRESTOR) 40 MG tablet, Take 1 Tablet by mouth every day., Disp: 90 Tablet, Rfl: 3  •  metoprolol tartrate (LOPRESSOR) 25 MG Tab, Take 1 tablet by mouth 2 times a day. Please call [phone] for a follow up visit for further refills., Disp: 180 tablet, Rfl: 3  •  pantoprazole (PROTONIX) 40 MG Tablet Delayed Response, Take 1 tablet by mouth every day. (Patient not taking: Reported on 3/6/2022), Disp: 30 tablet, Rfl: 0  •  aspirin 81 MG EC tablet, Take 1 Tab by mouth every day., Disp: 90 Tab, Rfl:  "3  ALLERGIES: No Known Allergies  SURGHX:   Past Surgical History:   Procedure Laterality Date   • ZZZ CARDIAC CATH  12/12/2018    Rasheed to 1st diagonal.     SOCHX:  reports that he has never smoked. He has never used smokeless tobacco. He reports current alcohol use of about 4.2 oz of alcohol per week. He reports that he does not use drugs.  FH: Family history was reviewed, no pertinent findings to report    Objective   /88 (BP Location: Right arm, Patient Position: Sitting, BP Cuff Size: Large adult)   Pulse 80   Temp 36.9 °C (98.4 °F) (Temporal)   Resp 18   Ht 1.702 m (5' 7\")   Wt 97.5 kg (215 lb)   SpO2 96%   BMI 33.67 kg/m²     No acute distress    Shoulder exam:  Range of motion MARKEDLY decreased with abduction and external rotation of the LEFT compared to the right  Strength testing NORMAL with empty can, 4/5 with internal rotation on the LEFT, external rotation and unable to perform lift off testing secondary to patient discomfort on the LEFT  NO tenderness of the supraspinatus, infraspinatus or biceps tendon  Apprehension testing NORMAL    Significant/large ecchymosis of the LEFT chest wall and upper arm  With significant swelling of the LEFT chest wall  POSITIVE dropped nipple sign on the LEFT    1. Traumatic ecchymosis of chest, initial encounter  MR-SHOULDER-W/O LEFT    CANCELED: MR-SHOULDER-W/O LEFT   2. Pectoralis muscle rupture, initial encounter  MR-SHOULDER-W/O LEFT    CANCELED: MR-SHOULDER-W/O LEFT     Date of injury, Saturday, March 5, 2022  Mechanism injury, performing a flat bench with 325 pounds and felt sudden crampy pain  Weight needed to be removed by his     Clinically presents with history and typical exam findings consistent with acute pectoralis major tear  Check MRI of the LEFT shoulder for assessment of the labrum and rotator cuff since it is difficult to perform physical examination due to acuity of injury    Provided/sent info on pec tear     Follow-up after MRI to " discuss results and further management options    Thank you Emiliano Mckinnon PA-C for allowing me to participate in caring for your patient.

## 2022-03-28 ENCOUNTER — HOSPITAL ENCOUNTER (OUTPATIENT)
Dept: RADIOLOGY | Facility: MEDICAL CENTER | Age: 47
End: 2022-03-28
Attending: FAMILY MEDICINE
Payer: COMMERCIAL

## 2022-03-28 DIAGNOSIS — S20.20XA TRAUMATIC ECCHYMOSIS OF CHEST, INITIAL ENCOUNTER: ICD-10-CM

## 2022-03-28 DIAGNOSIS — S29.011A PECTORALIS MUSCLE RUPTURE, INITIAL ENCOUNTER: ICD-10-CM

## 2022-03-28 PROCEDURE — 71550 MRI CHEST W/O DYE: CPT

## 2022-04-01 ENCOUNTER — OFFICE VISIT (OUTPATIENT)
Dept: SPORTS MEDICINE | Facility: CLINIC | Age: 47
End: 2022-04-01
Payer: COMMERCIAL

## 2022-04-01 VITALS — WEIGHT: 215 LBS | BODY MASS INDEX: 33.74 KG/M2 | HEIGHT: 67 IN

## 2022-04-01 DIAGNOSIS — S29.011D: ICD-10-CM

## 2022-04-01 PROCEDURE — 99213 OFFICE O/P EST LOW 20 MIN: CPT | Performed by: FAMILY MEDICINE

## 2022-04-01 NOTE — PROGRESS NOTES
"Chief Complaint   Patient presents with   • Chest Injury     F/V MRI results        Subjective   Referred by Emiliano Mckinnon PA-C for evaluation of LEFT pectoralis injury (RIGHT-handed dominant)  Date of injury, Saturday, March 5, 2022  Mechanism injury, performing a flat bench with 325 pounds and felt sudden crampy pain  Weight needed to be removed by his   After that he experienced discomfort and mild pain  He feels tight with certain movements  And certain movements cause pain, but he is not really having much pain at baseline    Somewhat better  Also having intermittent pain if he pushes something forward or moves his arm the wrong way  He discuss MRI results    , mostly office work  Likes lifting weights, golfing    Objective   Ht 1.702 m (5' 7\")   Wt 97.5 kg (215 lb)   BMI 33.67 kg/m²     No acute distress    Shoulder exam:  Range of motion Slightly decreased with abduction and external rotation of the LEFT compared to the right  Strength testing NORMAL with empty can, 4/5 with internal rotation on the LEFT, external rotation and unable to perform lift off testing secondary to patient discomfort on the LEFT  NO tenderness of the supraspinatus, infraspinatus or biceps tendon  Apprehension testing NORMAL    POSITIVE dropped nipple sign on the LEFT    1. Pectoral muscle rupture, subsequent encounter  Referral to Physical Therapy     Date of injury, Saturday, March 5, 2022  Mechanism injury, performing a flat bench with 325 pounds and felt sudden crampy pain  Weight needed to be removed by his     He has now exceeded his optimal surgical window  Unfortunately, his MRI took too long to process  At this point, he is comfortable with proceeding with nonoperative management    [facility]  [address], [place] [address]    Return in about 6 weeks (around 5/13/2022).  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        TECHNIQUE/EXAM DESCRIPTION:  MRI of the chest (pectoralis) without contrast.     The study was performed on a [employer] Signa 1.5 Hannah MRI scanner. T1 axial, T2 fat-suppressed axial, T1 coronal, T2 fat-suppressed coronal and T1 sagittal images were obtained of the brachial plexus.     COMPARISON:  None.     FINDINGS:     There is complete tear and retraction of the pectoralis major tendon. There is a 7.7 cm gap with fluid.     Associated stranding and tear of the muscle belly.     No fracture or dislocation.           IMPRESSION:        There is complete tear and retraction of the left pectoralis major tendon from the distal insertion to the humerus.     Associated strain of the left pectoralis major muscle belly.             Exam Ended: 03/28/22  6:18 PM Last Resulted: 03/29/22  9:51 AM           Interpreted in the office today with the patient    Thank you Emiliano Mckinnon PA-C for allowing me to participate in caring for your patient.

## 2022-08-17 DIAGNOSIS — E78.2 MIXED HYPERLIPIDEMIA: ICD-10-CM

## 2022-08-18 RX ORDER — ROSUVASTATIN CALCIUM 40 MG/1
40 TABLET, COATED ORAL
Qty: 90 TABLET | Refills: 3 | OUTPATIENT
Start: 2022-08-18

## 2022-08-18 NOTE — TELEPHONE ENCOUNTER
Is the patient due for a refill? No    Was the patient seen the past year? No    Date of last office visit: 07/19/2021    Does the patient have an upcoming appointment?  No    Provider to refill:ELVIS (ADD)    Does the patients insurance require a 100 day supply?  No      rosuvastatin (CRESTOR) 40 MG tablet 90 Tablet 3/3 1/7/2022     Sig - Route: Take 1 Tablet by mouth every day. - Oral    Sent to pharmacy as: Rosuvastatin Calcium 40 MG Oral Tablet (CRESTOR)    Notes to Pharmacy: .    Cosign for Ordering: Accepted by JEWEL BakerPREGINO on 1/9/2022  1:55 PM    E-Prescribing Status: Receipt confirmed by pharmacy (1/7/2022  3:38 PM PST)      Pharmacy    [id number] - [place], [place] - [address]

## 2022-10-02 DIAGNOSIS — I25.110 CORONARY ARTERY DISEASE INVOLVING NATIVE CORONARY ARTERY OF NATIVE HEART WITH UNSTABLE ANGINA PECTORIS (HCC): ICD-10-CM

## 2022-10-03 NOTE — TELEPHONE ENCOUNTER
Is the patient due for a refill? Yes    Was the patient seen the past year? No    Date of last office visit: 7/19/21    Does the patient have an upcoming appointment?  No    Provider to refill:Cora  Ok to fill under ADD  SW? Thank you      Does the patients insurance require a 100 day supply?  No

## 2022-10-04 NOTE — TELEPHONE ENCOUNTER
Reviewed chart, pt last seen 7/2021 with recommended 6 mo f/u. Pt overdue for f/u. 1st courtesy refill sent, msg sent to scheduling for f/u appt.

## 2023-01-04 DIAGNOSIS — I25.110 CORONARY ARTERY DISEASE INVOLVING NATIVE CORONARY ARTERY OF NATIVE HEART WITH UNSTABLE ANGINA PECTORIS (HCC): ICD-10-CM

## 2023-02-09 DIAGNOSIS — I25.110 CORONARY ARTERY DISEASE INVOLVING NATIVE CORONARY ARTERY OF NATIVE HEART WITH UNSTABLE ANGINA PECTORIS (HCC): ICD-10-CM

## 2023-02-10 NOTE — TELEPHONE ENCOUNTER
Is the patient due for a refill? Yes 3rd courtesy refill    Was the patient seen the past year? No    Date of last office visit: 7/19/21    Does the patient have an upcoming appointment?  No       Provider to refill: ADD SW    Does the patients insurance require a 100 day supply?  No

## 2023-02-15 NOTE — TELEPHONE ENCOUNTER
3rd courtesy refill sent for 2 week supply. Pt was already contacted to schedule f/u and letter was sent out.

## 2023-04-28 ENCOUNTER — TELEPHONE (OUTPATIENT)
Dept: CARDIOLOGY | Facility: MEDICAL CENTER | Age: 48
End: 2023-04-28
Payer: COMMERCIAL

## 2023-04-28 ENCOUNTER — PATIENT MESSAGE (OUTPATIENT)
Dept: CARDIOLOGY | Facility: MEDICAL CENTER | Age: 48
End: 2023-04-28
Payer: COMMERCIAL

## 2023-04-28 DIAGNOSIS — E78.2 MIXED HYPERLIPIDEMIA: ICD-10-CM

## 2023-04-28 RX ORDER — ROSUVASTATIN CALCIUM 40 MG/1
40 TABLET, COATED ORAL
Qty: 90 TABLET | Refills: 0 | Status: SHIPPED | OUTPATIENT
Start: 2023-04-28 | End: 2023-07-27 | Stop reason: SDUPTHER

## 2023-04-28 NOTE — TELEPHONE ENCOUNTER
ADD        Caller: Jose Juan Hatch      Topic/issue: Patient was calling about his rosuvastatin (CRESTOR) 40 MG tablet medication and he was not able to get the refill for them and he was asking for a call back to discuss it further      Callback Number: [phone] (home)     Thank you    -Brock PATEL

## 2023-04-29 NOTE — TELEPHONE ENCOUNTER
Upon chart review, pt was last seen by APN 7/2021.  With reminders to schedule for [facility], see [place] schedulers telephone note signed 10/4/22.  Upon chart review, pt is currently scheduled with MD 7/27/2023.      Lab ordered, lab slip printed, and mailed to pt mailing address; 90 day courtesy refill sent per refill protocol.

## 2023-07-27 ENCOUNTER — TELEPHONE (OUTPATIENT)
Dept: CARDIOLOGY | Facility: MEDICAL CENTER | Age: 48
End: 2023-07-27
Payer: COMMERCIAL

## 2023-07-27 DIAGNOSIS — E78.2 MIXED HYPERLIPIDEMIA: ICD-10-CM

## 2023-07-27 RX ORDER — ROSUVASTATIN CALCIUM 40 MG/1
40 TABLET, COATED ORAL
Qty: 90 TABLET | Refills: 0 | Status: SHIPPED | OUTPATIENT
Start: 2023-07-27 | End: 2023-10-25 | Stop reason: SDUPTHER

## 2023-07-27 NOTE — TELEPHONE ENCOUNTER
ADD - MK    Caller: Jose Juan Hatch    Medication Name and Dosage:   rosuvastatin (CRESTOR) 40 MG tablet    Medication amount left: 3    Preferred Pharmacy:  [id number] - [place], [place] - [address] [address]   Phone: [phone]  Fax: [phone]    Other questions (Topic): Patient last saw Shefali Ngo on 07- and now has a FV scheduled for 09-, so he is wondering if he can get a courtesy refill.    Callback Number (Will only call for issues): [phone]    Thank you,  -Ting PATEL

## 2023-09-15 ENCOUNTER — APPOINTMENT (OUTPATIENT)
Dept: CARDIOLOGY | Facility: MEDICAL CENTER | Age: 48
End: 2023-09-15
Attending: INTERNAL MEDICINE
Payer: COMMERCIAL

## 2023-10-24 ENCOUNTER — TELEPHONE (OUTPATIENT)
Dept: CARDIOLOGY | Facility: MEDICAL CENTER | Age: 48
End: 2023-10-24
Payer: COMMERCIAL

## 2023-10-24 NOTE — TELEPHONE ENCOUNTER
ADD     Caller: Jose Juan Hatch    Medication Name and Dosage:   metoprolol tartrate (LOPRESSOR) 25 MG Tab  rosuvastatin (CRESTOR) 40 MG tablet  Patient is on the schedule to see office on 11/9/23, last ov was 7/9/21 with KAYLEE Ngo     Medication amount left: Metoprolol 0 and Rosuvastatin 2 pills     Preferred Pharmacy: [id number] - [place], [place] - [address]    Callback Number (Will only call for issues): [phone]    Thank You   Brandy APARICIO

## 2023-10-24 NOTE — TELEPHONE ENCOUNTER
To MK: ADD 10/24 - Patient last seen by provider no longer with our clinic in 2021. New appointment to establish care scheduled for 11/9. Okay to refill medications below for up until his appointment date? Please advise, thank you!

## 2023-10-24 NOTE — TELEPHONE ENCOUNTER
Trace ROSENBERG M.D.  You 5 minutes ago (4:06 PM)     Should be seen in office first before refill.     MK      Noted response. Patient has appointment on 11/9/23.

## 2023-10-25 ENCOUNTER — OFFICE VISIT (OUTPATIENT)
Dept: CARDIOLOGY | Facility: MEDICAL CENTER | Age: 48
End: 2023-10-25
Attending: INTERNAL MEDICINE
Payer: COMMERCIAL

## 2023-10-25 VITALS
HEART RATE: 78 BPM | RESPIRATION RATE: 16 BRPM | SYSTOLIC BLOOD PRESSURE: 116 MMHG | DIASTOLIC BLOOD PRESSURE: 78 MMHG | BODY MASS INDEX: 34.37 KG/M2 | HEIGHT: 67 IN | WEIGHT: 219 LBS | OXYGEN SATURATION: 97 %

## 2023-10-25 DIAGNOSIS — Z95.5 STATUS POST INSERTION OF DRUG ELUTING CORONARY ARTERY STENT: ICD-10-CM

## 2023-10-25 DIAGNOSIS — I25.110 CORONARY ARTERY DISEASE INVOLVING NATIVE CORONARY ARTERY OF NATIVE HEART WITH UNSTABLE ANGINA PECTORIS (HCC): ICD-10-CM

## 2023-10-25 DIAGNOSIS — I25.9 ISCHEMIC HEART DISEASE: ICD-10-CM

## 2023-10-25 DIAGNOSIS — I10 PRIMARY HYPERTENSION: ICD-10-CM

## 2023-10-25 DIAGNOSIS — I21.4 NSTEMI (NON-ST ELEVATED MYOCARDIAL INFARCTION) (HCC): ICD-10-CM

## 2023-10-25 DIAGNOSIS — E78.2 MIXED HYPERLIPIDEMIA: ICD-10-CM

## 2023-10-25 PROCEDURE — 99211 OFF/OP EST MAY X REQ PHY/QHP: CPT | Performed by: INTERNAL MEDICINE

## 2023-10-25 PROCEDURE — 3078F DIAST BP <80 MM HG: CPT | Performed by: INTERNAL MEDICINE

## 2023-10-25 PROCEDURE — 3074F SYST BP LT 130 MM HG: CPT | Performed by: INTERNAL MEDICINE

## 2023-10-25 PROCEDURE — 99214 OFFICE O/P EST MOD 30 MIN: CPT | Performed by: INTERNAL MEDICINE

## 2023-10-25 RX ORDER — ROSUVASTATIN CALCIUM 40 MG/1
40 TABLET, COATED ORAL
Qty: 90 TABLET | Refills: 4 | Status: SHIPPED | OUTPATIENT
Start: 2023-10-25

## 2023-10-25 RX ORDER — ASPIRIN 81 MG/1
81 TABLET ORAL
Qty: 90 TABLET | Refills: 3 | Status: SHIPPED | OUTPATIENT
Start: 2023-10-25

## 2023-10-25 ASSESSMENT — ENCOUNTER SYMPTOMS
PND: 0
COUGH: 0
CONSTIPATION: 0
FEVER: 0
SHORTNESS OF BREATH: 0
NAUSEA: 0
WEIGHT GAIN: 0
DECREASED APPETITE: 0
DIARRHEA: 0
VOMITING: 0
BACK PAIN: 0
BLURRED VISION: 0
DEPRESSION: 0
WEIGHT LOSS: 0
IRREGULAR HEARTBEAT: 0
NEAR-SYNCOPE: 0
HEARTBURN: 0
ALTERED MENTAL STATUS: 0
ORTHOPNEA: 0
DYSPNEA ON EXERTION: 0
FLANK PAIN: 0
SYNCOPE: 0
ABDOMINAL PAIN: 0
PALPITATIONS: 0
CLAUDICATION: 0
DIZZINESS: 0

## 2023-11-01 ENCOUNTER — TELEPHONE (OUTPATIENT)
Dept: HEALTH INFORMATION MANAGEMENT | Facility: OTHER | Age: 48
End: 2023-11-01
Payer: COMMERCIAL

## 2024-05-20 ENCOUNTER — OFFICE VISIT (OUTPATIENT)
Dept: URGENT CARE | Facility: PHYSICIAN GROUP | Age: 49
End: 2024-05-20
Payer: COMMERCIAL

## 2024-05-20 VITALS
OXYGEN SATURATION: 98 % | TEMPERATURE: 98 F | DIASTOLIC BLOOD PRESSURE: 70 MMHG | HEART RATE: 65 BPM | RESPIRATION RATE: 20 BRPM | HEIGHT: 67 IN | BODY MASS INDEX: 34.3 KG/M2 | SYSTOLIC BLOOD PRESSURE: 116 MMHG

## 2024-05-20 DIAGNOSIS — R05.1 ACUTE COUGH: ICD-10-CM

## 2024-05-20 PROCEDURE — 3078F DIAST BP <80 MM HG: CPT

## 2024-05-20 PROCEDURE — 99213 OFFICE O/P EST LOW 20 MIN: CPT

## 2024-05-20 PROCEDURE — 3074F SYST BP LT 130 MM HG: CPT

## 2024-05-20 RX ORDER — DEXTROMETHORPHAN HYDROBROMIDE AND PROMETHAZINE HYDROCHLORIDE 15; 6.25 MG/5ML; MG/5ML
5 SYRUP ORAL
Qty: 118 ML | Refills: 0 | Status: SHIPPED | OUTPATIENT
Start: 2024-05-20

## 2024-05-20 RX ORDER — PREDNISONE 10 MG/1
40 TABLET ORAL DAILY
Qty: 20 TABLET | Refills: 0 | Status: SHIPPED | OUTPATIENT
Start: 2024-05-20 | End: 2024-05-25

## 2024-05-20 ASSESSMENT — COPD QUESTIONNAIRES: COPD: 0

## 2024-05-20 ASSESSMENT — ENCOUNTER SYMPTOMS
SPUTUM PRODUCTION: 1
WHEEZING: 0
HEADACHES: 1
SHORTNESS OF BREATH: 0
MYALGIAS: 0
CHILLS: 0
NAUSEA: 0
FEVER: 0
DIARRHEA: 0
VOMITING: 0
SORE THROAT: 0
COUGH: 1
ABDOMINAL PAIN: 0

## 2024-05-20 NOTE — PROGRESS NOTES
"Subjective:   Jose Juan Hatch is a 48 y.o. male who presents for Cough (Congestion,sore throat,x3 days)      Cough  This is a new problem. Episode onset: x3 days. The problem has been gradually worsening. The cough is Productive of sputum. Associated symptoms include headaches. Pertinent negatives include no chest pain, chills, ear pain, fever, myalgias, nasal congestion, rash, sore throat, shortness of breath or wheezing. Nothing aggravates the symptoms. He has tried OTC cough suppressant for the symptoms. The treatment provided no relief. There is no history of asthma, bronchitis, COPD, emphysema, environmental allergies or pneumonia.       Review of Systems   Constitutional:  Negative for chills, fever and malaise/fatigue.   HENT:  Negative for congestion, ear pain, hearing loss and sore throat.    Respiratory:  Positive for cough and sputum production. Negative for shortness of breath and wheezing.    Cardiovascular:  Negative for chest pain.   Gastrointestinal:  Negative for abdominal pain, diarrhea, nausea and vomiting.   Genitourinary:  Negative for dysuria.   Musculoskeletal:  Negative for myalgias.   Skin:  Negative for rash.   Neurological:  Positive for headaches.   Endo/Heme/Allergies:  Negative for environmental allergies.       Medications, Allergies, and current problem list reviewed today in Epic.     Objective:     /70 (BP Location: Right arm, Patient Position: Sitting, BP Cuff Size: Adult long)   Pulse 65   Temp 36.7 °C (98 °F) (Temporal)   Resp 20   Ht 1.702 m (5' 7\")   SpO2 98%     Physical Exam  Vitals and nursing note reviewed.   Constitutional:       Appearance: Normal appearance.   HENT:      Head: Normocephalic and atraumatic.      Right Ear: Tympanic membrane normal.      Left Ear: Tympanic membrane normal.      Nose: Congestion present.      Mouth/Throat:      Mouth: Mucous membranes are moist.      Pharynx: Oropharynx is clear. Uvula midline. Posterior oropharyngeal erythema " present. No pharyngeal swelling, oropharyngeal exudate or uvula swelling.      Tonsils: No tonsillar exudate or tonsillar abscesses.   Eyes:      Conjunctiva/sclera: Conjunctivae normal.      Pupils: Pupils are equal, round, and reactive to light.   Cardiovascular:      Rate and Rhythm: Normal rate.      Heart sounds: Normal heart sounds.   Pulmonary:      Effort: Pulmonary effort is normal. No respiratory distress.      Breath sounds: Normal breath sounds. No wheezing.   Abdominal:      General: Abdomen is flat.      Palpations: Abdomen is soft.   Skin:     General: Skin is warm and dry.      Capillary Refill: Capillary refill takes less than 2 seconds.   Neurological:      Mental Status: He is alert and oriented to person, place, and time.   Psychiatric:         Mood and Affect: Mood normal.         Behavior: Behavior normal.         Assessment/Plan:       1. Acute cough  promethazine-dextromethorphan (PROMETHAZINE-DM) 6.25-15 MG/5ML syrup    predniSONE (DELTASONE) 10 MG Tab        After ROS and physical exam patient here with complaints of nasal congestion, cough, and sore throat for the past 3 days.  Patient reports that the nasal congestion has Subsided now has mostly cough and throat is irritated.  Patient denies any fevers or nausea vomiting.  Patient has no significant medical history.  Patient has been using over-the-counter treatments with minimal relief.  After assessment patient did have mild erythema to throat with no noted exudate or swelling.  There is no enlargement of tonsils or abscess noted and uvula was midline.  Lung sounds are clear via auscultation.  Patient was in no active distress at this time.  At this time I feel like patient symptoms are viral in nature and will provide patient 5-day course of prednisone and promethazine cough syrup as needed.  Patient was told to take this with no other sedative medications.  Recommend adequate hydration, rest, deep breathing and coughing, ambulation as  tolerated, OTC medications.  Patient to monitor for any worsening signs and symptoms of any other concerns return to the urgent care or emergency department for further management.    Differential diagnosis, natural history, and supportive care discussed. We also reviewed side effects of medication including allergic response, GI upset, tendon injury, rash, sedation etc. Patient and/or guardian voices understanding.      Advised the patient to follow-up with the primary care physician for recheck, reevaluation, and consideration of further management.    I personally reviewed prior external notes and test results pertinent to today's visit as well as additional imaging and testing completed in clinic today.     Please note that this dictation was created using voice recognition software. I have made every reasonable attempt to correct obvious errors, but I expect that there are errors of grammar and possibly content that I did not discover before finalizing the note.    This note was electronically signed by CARMELO Ordonez

## 2024-10-17 DIAGNOSIS — I25.110 CORONARY ARTERY DISEASE INVOLVING NATIVE CORONARY ARTERY OF NATIVE HEART WITH UNSTABLE ANGINA PECTORIS (HCC): ICD-10-CM

## 2024-10-17 RX ORDER — METOPROLOL TARTRATE 25 MG/1
25 TABLET, FILM COATED ORAL 2 TIMES DAILY
Qty: 180 TABLET | Refills: 0 | Status: SHIPPED | OUTPATIENT
Start: 2024-10-17

## 2025-01-19 DIAGNOSIS — E78.2 MIXED HYPERLIPIDEMIA: ICD-10-CM

## 2025-01-20 RX ORDER — ROSUVASTATIN CALCIUM 40 MG/1
40 TABLET, COATED ORAL
Qty: 90 TABLET | Refills: 3 | OUTPATIENT
Start: 2025-01-20

## 2025-01-20 NOTE — TELEPHONE ENCOUNTER
Is the patient due for a refill? Yes    Was the patient seen the past year? No    Date of last office visit: 10.25.2023    Does the patient have an upcoming appointment?  No       Provider to refill:VR    Does the patient have correction Plus and need 100-day supply? (This applies to ALL medications) Patient does not have SCP

## 2025-01-21 ENCOUNTER — TELEPHONE (OUTPATIENT)
Dept: CARDIOLOGY | Facility: MEDICAL CENTER | Age: 50
End: 2025-01-21
Payer: COMMERCIAL

## 2025-01-21 DIAGNOSIS — I25.110 CORONARY ARTERY DISEASE INVOLVING NATIVE CORONARY ARTERY OF NATIVE HEART WITH UNSTABLE ANGINA PECTORIS (HCC): ICD-10-CM

## 2025-01-21 DIAGNOSIS — E78.2 MIXED HYPERLIPIDEMIA: ICD-10-CM

## 2025-01-21 RX ORDER — ROSUVASTATIN CALCIUM 40 MG/1
40 TABLET, COATED ORAL
Qty: 90 TABLET | Refills: 0 | Status: SHIPPED | OUTPATIENT
Start: 2025-01-21

## 2025-01-21 RX ORDER — METOPROLOL TARTRATE 25 MG/1
25 TABLET, FILM COATED ORAL 2 TIMES DAILY
Qty: 180 TABLET | Refills: 0 | Status: SHIPPED | OUTPATIENT
Start: 2025-01-21

## 2025-01-21 NOTE — TELEPHONE ENCOUNTER
VR    Caller: Jose Juan Hatch    Topic/issue: Patient called earlier and made an appointment with VR on 3/27 (first available appointment at [facility]). Patient is requesting medication refills on metoprolol tartrate (LOPRESSOR) 25 MG Tab  and rosuvastatin (CRESTOR) 40 MG tablet . He is overdue for a visit so is asking for a courtesy refill until this appointment as he is currently out of medication. Please advise.     Callback Number: [phone]    Thank you,  Charley PATEL

## 2025-03-21 DIAGNOSIS — I25.110 CORONARY ARTERY DISEASE INVOLVING NATIVE CORONARY ARTERY OF NATIVE HEART WITH UNSTABLE ANGINA PECTORIS (HCC): ICD-10-CM

## 2025-03-27 ENCOUNTER — APPOINTMENT (OUTPATIENT)
Dept: CARDIOLOGY | Facility: MEDICAL CENTER | Age: 50
End: 2025-03-27
Attending: INTERNAL MEDICINE
Payer: COMMERCIAL

## 2025-04-19 DIAGNOSIS — E78.2 MIXED HYPERLIPIDEMIA: ICD-10-CM

## 2025-04-20 ENCOUNTER — TELEPHONE (OUTPATIENT)
Dept: CARDIOLOGY | Facility: MEDICAL CENTER | Age: 50
End: 2025-04-20
Payer: COMMERCIAL

## 2025-04-20 DIAGNOSIS — I25.110 CORONARY ARTERY DISEASE INVOLVING NATIVE CORONARY ARTERY OF NATIVE HEART WITH UNSTABLE ANGINA PECTORIS (HCC): ICD-10-CM

## 2025-04-22 RX ORDER — METOPROLOL TARTRATE 25 MG/1
25 TABLET, FILM COATED ORAL 2 TIMES DAILY
Qty: 180 TABLET | Refills: 0 | OUTPATIENT
Start: 2025-04-22

## 2025-04-22 RX ORDER — METOPROLOL TARTRATE 25 MG/1
25 TABLET, FILM COATED ORAL 2 TIMES DAILY
Qty: 180 TABLET | Refills: 0 | Status: SHIPPED | OUTPATIENT
Start: 2025-04-22

## 2025-04-22 NOTE — TELEPHONE ENCOUNTER
DION    Caller: Jose Juan Hatch    Topic/issue: Patient scheduled follow up appointment with Cardiology for 2025 and asking if his refills can be sent to pharmacy for the following medications:    Metoprolol and Rosuvastatin  Amount remainin day  Pharmacy: [facility] on file    Callback Number: [phone]    Thank you,  Halle RUSHING

## 2025-04-23 NOTE — TELEPHONE ENCOUNTER
Is the patient due for a refill? Yes    Was the patient seen the last 15 months? No    Date of last office visit: 10/25/23    Does the patient have an upcoming appointment?  Yes   If yes, When? 6/4/25    Provider to refill:VR    Does the patient have USP Plus and need 100-day supply? (This applies to ALL medications) Patient does not have SCP

## 2025-04-24 RX ORDER — ROSUVASTATIN CALCIUM 40 MG/1
40 TABLET, COATED ORAL
Qty: 90 TABLET | Refills: 0 | Status: SHIPPED | OUTPATIENT
Start: 2025-04-24

## 2025-07-19 DIAGNOSIS — E78.2 MIXED HYPERLIPIDEMIA: ICD-10-CM

## 2025-07-20 DIAGNOSIS — I25.110 CORONARY ARTERY DISEASE INVOLVING NATIVE CORONARY ARTERY OF NATIVE HEART WITH UNSTABLE ANGINA PECTORIS (HCC): ICD-10-CM

## 2025-07-21 RX ORDER — ROSUVASTATIN CALCIUM 40 MG/1
40 TABLET, COATED ORAL
Qty: 90 TABLET | Refills: 0 | Status: SHIPPED | OUTPATIENT
Start: 2025-07-21

## 2025-07-21 RX ORDER — METOPROLOL TARTRATE 25 MG/1
25 TABLET, FILM COATED ORAL 2 TIMES DAILY
Qty: 180 TABLET | Refills: 0 | Status: SHIPPED | OUTPATIENT
Start: 2025-07-21

## 2025-07-21 NOTE — TELEPHONE ENCOUNTER
Is the patient due for a refill? Yes    Was the patient seen the last 15 months? No    Date of last office visit: 10/25/2023    Does the patient have an upcoming appointment?  No       Provider to refill:VR    Does the patient have [facility] Plus and need 100-day supply? (This applies to ALL medications) Patient does not have SCP

## 2025-07-21 NOTE — TELEPHONE ENCOUNTER
Is the patient due for a refill? Yes    Was the patient seen the last 15 months? No    Date of last office visit: 10/25/23    Does the patient have an upcoming appointment?  No    Provider to refill:VR    Does the patient have longterm Plus and need 100-day supply? (This applies to ALL medications) Patient does not have SCP

## 2025-07-21 NOTE — TELEPHONE ENCOUNTER
Courtesy Rx refill for Rosuvastatin placed and sent to preferred pharmacy. Last OV 10/25/24.Due for Lipid profile, orders placed.  Next OV 10/22/25. Reminders sent via [employer] and by mail.     To Scheduling.  Kindly contact patient to schedule next annual visit. Thank you very much!

## 2025-08-12 ENCOUNTER — PATIENT MESSAGE (OUTPATIENT)
Dept: MEDICAL GROUP | Facility: CLINIC | Age: 50
End: 2025-08-12

## 2025-08-12 ENCOUNTER — APPOINTMENT (OUTPATIENT)
Dept: MEDICAL GROUP | Facility: CLINIC | Age: 50
End: 2025-08-12
Payer: COMMERCIAL

## 2025-08-12 PROBLEM — E88.810 METABOLIC SYNDROME: Status: ACTIVE | Noted: 2025-08-12

## 2025-08-12 PROBLEM — R06.83 SNORING: Status: ACTIVE | Noted: 2025-08-12

## 2025-08-12 SDOH — HEALTH STABILITY: PHYSICAL HEALTH: ON AVERAGE, HOW MANY DAYS PER WEEK DO YOU ENGAGE IN MODERATE TO STRENUOUS EXERCISE (LIKE A BRISK WALK)?: 5 DAYS

## 2025-08-12 SDOH — ECONOMIC STABILITY: INCOME INSECURITY: HOW HARD IS IT FOR YOU TO PAY FOR THE VERY BASICS LIKE FOOD, HOUSING, MEDICAL CARE, AND HEATING?: NOT VERY HARD

## 2025-08-12 SDOH — HEALTH STABILITY: PHYSICAL HEALTH: ON AVERAGE, HOW MANY MINUTES DO YOU ENGAGE IN EXERCISE AT THIS LEVEL?: 30 MIN

## 2025-08-12 SDOH — ECONOMIC STABILITY: FOOD INSECURITY: WITHIN THE PAST 12 MONTHS, THE FOOD YOU BOUGHT JUST DIDN'T LAST AND YOU DIDN'T HAVE MONEY TO GET MORE.: NEVER TRUE

## 2025-08-12 SDOH — ECONOMIC STABILITY: INCOME INSECURITY: IN THE LAST 12 MONTHS, WAS THERE A TIME WHEN YOU WERE NOT ABLE TO PAY THE MORTGAGE OR RENT ON TIME?: NO

## 2025-08-12 SDOH — HEALTH STABILITY: MENTAL HEALTH
STRESS IS WHEN SOMEONE FEELS TENSE, NERVOUS, ANXIOUS, OR CAN'T SLEEP AT NIGHT BECAUSE THEIR MIND IS TROUBLED. HOW STRESSED ARE YOU?: ONLY A LITTLE

## 2025-08-12 SDOH — ECONOMIC STABILITY: FOOD INSECURITY: WITHIN THE PAST 12 MONTHS, YOU WORRIED THAT YOUR FOOD WOULD RUN OUT BEFORE YOU GOT MONEY TO BUY MORE.: NEVER TRUE

## 2025-08-12 ASSESSMENT — SOCIAL DETERMINANTS OF HEALTH (SDOH)
HOW OFTEN DO YOU HAVE A DRINK CONTAINING ALCOHOL: 2-3 TIMES A WEEK
HOW OFTEN DO YOU ATTEND CHURCH OR RELIGIOUS SERVICES?: NEVER
HOW OFTEN DO YOU HAVE SIX OR MORE DRINKS ON ONE OCCASION: NEVER
HOW MANY DRINKS CONTAINING ALCOHOL DO YOU HAVE ON A TYPICAL DAY WHEN YOU ARE DRINKING: 1 OR 2
HOW HARD IS IT FOR YOU TO PAY FOR THE VERY BASICS LIKE FOOD, HOUSING, MEDICAL CARE, AND HEATING?: NOT VERY HARD
WITHIN THE PAST 12 MONTHS, YOU WORRIED THAT YOUR FOOD WOULD RUN OUT BEFORE YOU GOT THE MONEY TO BUY MORE: NEVER TRUE
HOW OFTEN DO YOU ATTENT MEETINGS OF THE CLUB OR ORGANIZATION YOU BELONG TO?: NEVER
HOW OFTEN DO YOU ATTENT MEETINGS OF THE CLUB OR ORGANIZATION YOU BELONG TO?: NEVER
DO YOU BELONG TO ANY CLUBS OR ORGANIZATIONS SUCH AS CHURCH GROUPS UNIONS, FRATERNAL OR ATHLETIC GROUPS, OR SCHOOL GROUPS?: NO
HOW OFTEN DO YOU GET TOGETHER WITH FRIENDS OR RELATIVES?: ONCE A WEEK
DO YOU BELONG TO ANY CLUBS OR ORGANIZATIONS SUCH AS CHURCH GROUPS UNIONS, FRATERNAL OR ATHLETIC GROUPS, OR SCHOOL GROUPS?: NO
HOW OFTEN DO YOU GET TOGETHER WITH FRIENDS OR RELATIVES?: ONCE A WEEK
HOW OFTEN DO YOU ATTEND CHURCH OR RELIGIOUS SERVICES?: NEVER
IN A TYPICAL WEEK, HOW MANY TIMES DO YOU TALK ON THE PHONE WITH FAMILY, FRIENDS, OR NEIGHBORS?: MORE THAN THREE TIMES A WEEK
IN A TYPICAL WEEK, HOW MANY TIMES DO YOU TALK ON THE PHONE WITH FAMILY, FRIENDS, OR NEIGHBORS?: MORE THAN THREE TIMES A WEEK
IN THE PAST 12 MONTHS, HAS THE ELECTRIC, GAS, OIL, OR WATER COMPANY THREATENED TO SHUT OFF SERVICE IN YOUR HOME?: NO

## 2025-08-12 ASSESSMENT — LIFESTYLE VARIABLES
HOW OFTEN DO YOU HAVE SIX OR MORE DRINKS ON ONE OCCASION: NEVER
AUDIT-C TOTAL SCORE: 3
SKIP TO QUESTIONS 9-10: 1
HOW MANY STANDARD DRINKS CONTAINING ALCOHOL DO YOU HAVE ON A TYPICAL DAY: 1 OR 2
HOW OFTEN DO YOU HAVE A DRINK CONTAINING ALCOHOL: 2-3 TIMES A WEEK

## 2025-08-12 ASSESSMENT — PATIENT HEALTH QUESTIONNAIRE - PHQ9: CLINICAL INTERPRETATION OF PHQ2 SCORE: 0

## 2025-08-18 DIAGNOSIS — I25.110 CORONARY ARTERY DISEASE INVOLVING NATIVE CORONARY ARTERY OF NATIVE HEART WITH UNSTABLE ANGINA PECTORIS (HCC): ICD-10-CM

## 2025-09-12 ENCOUNTER — APPOINTMENT (OUTPATIENT)
Dept: MEDICAL GROUP | Facility: CLINIC | Age: 50
End: 2025-09-12
Payer: COMMERCIAL